# Patient Record
Sex: FEMALE | Race: WHITE | NOT HISPANIC OR LATINO | Employment: STUDENT | URBAN - METROPOLITAN AREA
[De-identification: names, ages, dates, MRNs, and addresses within clinical notes are randomized per-mention and may not be internally consistent; named-entity substitution may affect disease eponyms.]

---

## 2021-03-05 ENCOUNTER — OFFICE VISIT (OUTPATIENT)
Dept: FAMILY MEDICINE CLINIC | Facility: CLINIC | Age: 21
End: 2021-03-05

## 2021-03-05 VITALS
HEART RATE: 75 BPM | BODY MASS INDEX: 21.19 KG/M2 | WEIGHT: 135 LBS | TEMPERATURE: 98.1 F | RESPIRATION RATE: 22 BRPM | OXYGEN SATURATION: 99 % | HEIGHT: 67 IN | SYSTOLIC BLOOD PRESSURE: 110 MMHG | DIASTOLIC BLOOD PRESSURE: 78 MMHG

## 2021-03-05 DIAGNOSIS — Z11.8 SCREENING FOR CHLAMYDIAL DISEASE: ICD-10-CM

## 2021-03-05 DIAGNOSIS — F06.30 MENSTRUAL-RELATED MOOD DISORDER: ICD-10-CM

## 2021-03-05 DIAGNOSIS — Z00.00 ANNUAL PHYSICAL EXAM: ICD-10-CM

## 2021-03-05 DIAGNOSIS — Z83.3 FAMILY HISTORY OF DIABETES MELLITUS: ICD-10-CM

## 2021-03-05 DIAGNOSIS — F41.9 ANXIETY: ICD-10-CM

## 2021-03-05 DIAGNOSIS — Z00.00 ENCOUNTER FOR MEDICAL EXAMINATION TO ESTABLISH CARE: Primary | ICD-10-CM

## 2021-03-05 LAB
ALBUMIN SERPL-MCNC: 4.5 G/DL (ref 3.5–5.2)
ALBUMIN/GLOB SERPL: 1.3 G/DL
ALP SERPL-CCNC: 61 U/L (ref 39–117)
ALT SERPL W P-5'-P-CCNC: 10 U/L (ref 1–33)
ANION GAP SERPL CALCULATED.3IONS-SCNC: 7.8 MMOL/L (ref 5–15)
AST SERPL-CCNC: 19 U/L (ref 1–32)
BASOPHILS # BLD AUTO: 0.03 10*3/MM3 (ref 0–0.2)
BASOPHILS NFR BLD AUTO: 0.7 % (ref 0–1.5)
BILIRUB BLD-MCNC: NEGATIVE MG/DL
BILIRUB SERPL-MCNC: 0.6 MG/DL (ref 0–1.2)
BUN SERPL-MCNC: 14 MG/DL (ref 6–20)
BUN/CREAT SERPL: 21.9 (ref 7–25)
CALCIUM SPEC-SCNC: 9.7 MG/DL (ref 8.6–10.5)
CHLORIDE SERPL-SCNC: 105 MMOL/L (ref 98–107)
CHOLEST SERPL-MCNC: 156 MG/DL (ref 0–200)
CLARITY, POC: ABNORMAL
CO2 SERPL-SCNC: 24.2 MMOL/L (ref 22–29)
COLOR UR: YELLOW
CREAT SERPL-MCNC: 0.64 MG/DL (ref 0.57–1)
DEPRECATED RDW RBC AUTO: 40.4 FL (ref 37–54)
EOSINOPHIL # BLD AUTO: 0.11 10*3/MM3 (ref 0–0.4)
EOSINOPHIL NFR BLD AUTO: 2.4 % (ref 0.3–6.2)
ERYTHROCYTE [DISTWIDTH] IN BLOOD BY AUTOMATED COUNT: 12.9 % (ref 12.3–15.4)
ESTRADIOL SERPL HS-MCNC: 260 PG/ML
EXPIRATION DATE: ABNORMAL
FSH SERPL-ACNC: 2.54 MIU/ML
GFR SERPL CREATININE-BSD FRML MDRD: 118 ML/MIN/1.73
GLOBULIN UR ELPH-MCNC: 3.4 GM/DL
GLUCOSE SERPL-MCNC: 84 MG/DL (ref 65–99)
GLUCOSE UR STRIP-MCNC: NEGATIVE MG/DL
HBA1C MFR BLD: 5.32 % (ref 4.8–5.6)
HCT VFR BLD AUTO: 41.4 % (ref 34–46.6)
HDLC SERPL-MCNC: 68 MG/DL (ref 40–60)
HGB BLD-MCNC: 13.6 G/DL (ref 12–15.9)
IMM GRANULOCYTES # BLD AUTO: 0 10*3/MM3 (ref 0–0.05)
IMM GRANULOCYTES NFR BLD AUTO: 0 % (ref 0–0.5)
KETONES UR QL: NEGATIVE
LDLC SERPL CALC-MCNC: 74 MG/DL (ref 0–100)
LDLC/HDLC SERPL: 1.08 {RATIO}
LEUKOCYTE EST, POC: NEGATIVE
LH SERPL-ACNC: 4.45 MIU/ML
LYMPHOCYTES # BLD AUTO: 2.06 10*3/MM3 (ref 0.7–3.1)
LYMPHOCYTES NFR BLD AUTO: 45 % (ref 19.6–45.3)
Lab: 6023
MCH RBC QN AUTO: 28.7 PG (ref 26.6–33)
MCHC RBC AUTO-ENTMCNC: 32.9 G/DL (ref 31.5–35.7)
MCV RBC AUTO: 87.3 FL (ref 79–97)
MONOCYTES # BLD AUTO: 0.31 10*3/MM3 (ref 0.1–0.9)
MONOCYTES NFR BLD AUTO: 6.8 % (ref 5–12)
NEUTROPHILS NFR BLD AUTO: 2.07 10*3/MM3 (ref 1.7–7)
NEUTROPHILS NFR BLD AUTO: 45.1 % (ref 42.7–76)
NITRITE UR-MCNC: NEGATIVE MG/ML
NRBC BLD AUTO-RTO: 0 /100 WBC (ref 0–0.2)
PH UR: 6 [PH] (ref 5–8)
PLATELET # BLD AUTO: 164 10*3/MM3 (ref 140–450)
PMV BLD AUTO: 12.8 FL (ref 6–12)
POTASSIUM SERPL-SCNC: 4.9 MMOL/L (ref 3.5–5.2)
PROT SERPL-MCNC: 7.9 G/DL (ref 6–8.5)
PROT UR STRIP-MCNC: NEGATIVE MG/DL
RBC # BLD AUTO: 4.74 10*6/MM3 (ref 3.77–5.28)
RBC # UR STRIP: NEGATIVE /UL
SODIUM SERPL-SCNC: 137 MMOL/L (ref 136–145)
SP GR UR: 1.03 (ref 1–1.03)
TRIGL SERPL-MCNC: 74 MG/DL (ref 0–150)
TSH SERPL DL<=0.05 MIU/L-ACNC: 2.11 UIU/ML (ref 0.27–4.2)
UROBILINOGEN UR QL: NORMAL
VLDLC SERPL-MCNC: 14 MG/DL (ref 5–40)
WBC # BLD AUTO: 4.58 10*3/MM3 (ref 3.4–10.8)

## 2021-03-05 PROCEDURE — 99385 PREV VISIT NEW AGE 18-39: CPT | Performed by: NURSE PRACTITIONER

## 2021-03-05 PROCEDURE — 80061 LIPID PANEL: CPT | Performed by: NURSE PRACTITIONER

## 2021-03-05 PROCEDURE — 83001 ASSAY OF GONADOTROPIN (FSH): CPT | Performed by: NURSE PRACTITIONER

## 2021-03-05 PROCEDURE — 84443 ASSAY THYROID STIM HORMONE: CPT | Performed by: NURSE PRACTITIONER

## 2021-03-05 PROCEDURE — 83036 HEMOGLOBIN GLYCOSYLATED A1C: CPT | Performed by: NURSE PRACTITIONER

## 2021-03-05 PROCEDURE — 81003 URINALYSIS AUTO W/O SCOPE: CPT | Performed by: NURSE PRACTITIONER

## 2021-03-05 PROCEDURE — 82670 ASSAY OF TOTAL ESTRADIOL: CPT | Performed by: NURSE PRACTITIONER

## 2021-03-05 PROCEDURE — 85025 COMPLETE CBC W/AUTO DIFF WBC: CPT | Performed by: NURSE PRACTITIONER

## 2021-03-05 PROCEDURE — 80053 COMPREHEN METABOLIC PANEL: CPT | Performed by: NURSE PRACTITIONER

## 2021-03-05 PROCEDURE — 83002 ASSAY OF GONADOTROPIN (LH): CPT | Performed by: NURSE PRACTITIONER

## 2021-03-05 RX ORDER — BUSPIRONE HYDROCHLORIDE 5 MG/1
5 TABLET ORAL 2 TIMES DAILY
Qty: 60 TABLET | Refills: 1 | Status: SHIPPED | OUTPATIENT
Start: 2021-03-05 | End: 2021-08-15 | Stop reason: SDUPTHER

## 2021-03-05 RX ORDER — TYROSINE 500 MG
TABLET ORAL
COMMUNITY
End: 2022-01-17

## 2021-03-05 NOTE — PROGRESS NOTES
Follow Up Office Note     Patient Name: Boston Fenton  : 2000   MRN: [id number]     Chief Complaint:    Chief Complaint   Patient presents with   • Establish Care   • Annual Exam       History of Present Illness: Botson Fenton is a 20 y.o. female who presents today to establish care and for annual physical exam. Patient describes her overall health as good, but she does c/o significant anxiety. 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 She plans to attend law school at  in the fall.     Anxiety  Presents for initial visit. Onset was more than 5 years ago. The problem has been gradually worsening. Symptoms include excessive worry, insomnia, irritability, malaise, muscle tension, nervous/anxious behavior and restlessness. Patient reports no chest pain, decreased concentration, depressed mood, palpitations, panic, shortness of breath or suicidal ideas. Symptoms occur most days. The severity of symptoms is moderate. Exacerbated by: college stress. The quality of sleep is fair. Nighttime awakenings: several.     There are no known risk factors. Her past medical history is significant for anxiety/panic attacks. Past treatments include herbal remedies and lifestyle changes. The treatment provided mild relief. Compliance with prior treatments has been good.        Subjective      Review of Systems:   Review of Systems   Constitutional: Positive for irritability. Negative for activity change, appetite change, chills, diaphoresis, fatigue, fever and unexpected weight change.   HENT: Negative.    Eyes: Negative.    Respiratory: Negative.  Negative for chest tightness and shortness of breath.    Cardiovascular: Negative for chest pain, palpitations and leg swelling.   Gastrointestinal: Negative.    Endocrine: Negative.    Genitourinary: Positive for menstrual problem (Patient states that she believes that there is a hormonal component to her anxiety.  She is requesting  hormone levels be checked today. ). Negative for difficulty urinating, dysuria, flank pain, hematuria, pelvic pain, urgency, vaginal discharge and vaginal pain.   Musculoskeletal: Negative.    Skin: Negative.    Allergic/Immunologic: Positive for environmental allergies.   Neurological: Negative.    Hematological: Negative.    Psychiatric/Behavioral: Positive for dysphoric mood and sleep disturbance. Negative for decreased concentration and suicidal ideas. The patient is nervous/anxious and has insomnia.         Past Medical History: History reviewed. No pertinent past medical history.    Patient's last menstrual period was 02/05/2021.     Medications:     Current Outpatient Medications:   •  Dihydrotachysterol (DHT PO), Take  by mouth., Disp: , Rfl:   •  L-Tyrosine 500 MG tablet, Take  by mouth., Disp: , Rfl:   •  busPIRone (BUSPAR) 5 MG tablet, Take 1 tablet by mouth 2 (Two) Times a Day., Disp: 60 tablet, Rfl: 1    Allergies:   Allergies   Allergen Reactions   • Mucinex [Guaifenesin Er] Swelling     PHQ-2/PHQ-9 Depression Screening 3/5/2021   Little interest or pleasure in doing things 0   Feeling down, depressed, or hopeless 1   Trouble falling or staying asleep, or sleeping too much 0   Feeling tired or having little energy 0   Poor appetite or overeating 0   Feeling bad about yourself - or that you are a failure or have let yourself or your family down 0   Trouble concentrating on things, such as reading the newspaper or watching television 0   Moving or speaking so slowly that other people could have noticed. Or the opposite - being so fidgety or restless that you have been moving around a lot more than usual 0   Thoughts that you would be better off dead, or of hurting yourself in some way 0   Total Score 1   If you checked off any problems, how difficult have these problems made it for you to do your work, take care of things at home, or get along with other people? Not difficult at all     ZULY-7  Feeling  "nervous, anxious or on edge: Several days  Not being able to stop or control worrying: Not at all  Worrying too much about different things: Several days  Trouble Relaxing: Several days  Being so restless that it is hard to sit still: Several days  Feeling afraid as if something awful might happen: Not at all  Becoming easily annoyed or irritable: Several days  ZULY 7 Total Score: 5  If you checked any problems, how difficult have these problems made it for you to do your work, take care of things at home, or get along with other people: Somewhat difficult      Objective     Physical Exam:  Vital Signs:   Vitals:    03/05/21 0738   BP: 110/78   BP Location: Left arm   Patient Position: Sitting   Cuff Size: Adult   Pulse: 75   Resp: 22   Temp: 98.1 °F (36.7 °C)   SpO2: 99%   Weight: 61.2 kg (135 lb)   Height: 170.2 cm (67\")   PainSc: 0-No pain     Body mass index is 21.14 kg/m².     Physical Exam  Vitals signs and nursing note reviewed. Exam conducted with a chaperone present.   Constitutional:       General: She is not in acute distress.     Appearance: Normal appearance. She is well-developed and well-groomed. She is not ill-appearing, toxic-appearing or diaphoretic.   HENT:      Head: Normocephalic and atraumatic.      Right Ear: Tympanic membrane, ear canal and external ear normal.      Left Ear: Tympanic membrane, ear canal and external ear normal.      Nose: Nose normal.      Mouth/Throat:      Lips: Pink.      Mouth: Mucous membranes are moist.      Pharynx: Oropharynx is clear. Uvula midline. No posterior oropharyngeal erythema.   Eyes:      Pupils: Pupils are equal, round, and reactive to light.   Neck:      Musculoskeletal: Normal range of motion and neck supple.      Thyroid: No thyroid mass, thyromegaly or thyroid tenderness.   Cardiovascular:      Rate and Rhythm: Normal rate and regular rhythm.      Pulses: Normal pulses.      Heart sounds: Normal heart sounds, S1 normal and S2 normal. No murmur. "   Pulmonary:      Effort: Pulmonary effort is normal. No respiratory distress.      Breath sounds: Normal breath sounds.   Abdominal:      General: Bowel sounds are normal. There is no distension.      Palpations: Abdomen is soft.      Tenderness: There is no abdominal tenderness.   Musculoskeletal: Normal range of motion.      Right lower leg: No edema.      Left lower leg: No edema.   Lymphadenopathy:      Cervical: No cervical adenopathy.   Skin:     General: Skin is warm and dry.      Capillary Refill: Capillary refill takes 2 to 3 seconds.      Findings: No rash.   Neurological:      General: No focal deficit present.      Mental Status: She is alert and oriented to person, place, and time.   Psychiatric:         Attention and Perception: Attention and perception normal.         Mood and Affect: Mood and affect normal.         Speech: Speech normal.         Behavior: Behavior normal. Behavior is cooperative.         Thought Content: Thought content normal.         Cognition and Memory: Cognition and memory normal.         Judgment: Judgment normal.         Assessment / Plan      Assessment/Plan:   Diagnoses and all orders for this visit:    1. Encounter for medical examination to establish care (Primary)    2. Annual physical exam  -     POCT urinalysis dipstick, automated  -     Comprehensive Metabolic Panel  -     TSH  -     CBC Auto Differential  -     Lipid Panel  -     Cancel: Hepatitis C antibody    Brief Urine Lab Results  (Last result in the past 365 days)      Color   Clarity   Blood   Leuk Est   Nitrite   Protein   CREAT   Urine HCG        03/05/21 0752 Yellow Cloudy Negative Negative Negative Negative             3. Anxiety  -     busPIRone (BUSPAR) 5 MG tablet; Take 1 tablet by mouth 2 (Two) Times a Day.  Dispense: 60 tablet; Refill: 1  -     TSH        -     D/C L-Tyrosine    4. Menstrual-related mood disorder  -     Estradiol  -     Follicle Stimulating Hormone  -     Luteinizing Hormone    5.  Family history of diabetes mellitus  -     Hemoglobin A1c    6. Screening for chlamydial disease         -    UroSwab for chlamydia      Follow Up:   4 weeks for re-evaluation/management anxiety    Discussed the nature of the medical condition(s) risks, complications, implications, management, safe and proper use of medications. Encouraged medication compliance, and keeping scheduled follow up appointments with me and any other providers.      The patient is here for a health maintenance visit.  Currently, the patient consumes a healthy diet and has an adequate exercise regimen. Patient runs for exercise. Screening lab work is ordered.  Immunizations are declined today and vaccine education is provided.  Advice and education is given regarding nutrition, aerobic exercise, routine dental evaluations, routine eye exams, reproductive health, cardiovascular risk reduction, sunscreen use, self skin examination (annual dermatology evaluations) and seat belt use (general overall safety).  Further recommendations after lab evaluation.  Annual wellness evaluations recommended.      ANGELES Duran  [facility] [place]       Please note that portions of this note may have been completed with a voice recognition program. Efforts were made to edit the dictations, but occasionally words are mistranscribed.

## 2021-03-05 NOTE — PATIENT INSTRUCTIONS
Health Maintenance, Female  Adopting a healthy lifestyle and getting preventive care are important in promoting health and wellness. Ask your health care provider about:  · The right schedule for you to have regular tests and exams.  · Things you can do on your own to prevent diseases and keep yourself healthy.  What should I know about diet, weight, and exercise?  Eat a healthy diet    · Eat a diet that includes plenty of vegetables, fruits, low-fat dairy products, and lean protein.  · Do not eat a lot of foods that are high in solid fats, added sugars, or sodium.  Maintain a healthy weight  Body mass index (BMI) is used to identify weight problems. It estimates body fat based on height and weight. Your health care provider can help determine your BMI and help you achieve or maintain a healthy weight.  Get regular exercise  Get regular exercise. This is one of the most important things you can do for your health. Most adults should:  · Exercise for at least 150 minutes each week. The exercise should increase your heart rate and make you sweat (moderate-intensity exercise).  · Do strengthening exercises at least twice a week. This is in addition to the moderate-intensity exercise.  · Spend less time sitting. Even light physical activity can be beneficial.  Watch cholesterol and blood lipids  Have your blood tested for lipids and cholesterol at 20 years of age, then have this test every 5 years.  Have your cholesterol levels checked more often if:  · Your lipid or cholesterol levels are high.  · You are older than 40 years of age.  · You are at high risk for heart disease.  What should I know about cancer screening?  Depending on your health history and family history, you may need to have cancer screening at various ages. This may include screening for:  · Breast cancer.  · Cervical cancer.  · Colorectal cancer.  · Skin cancer.  · Lung cancer.  What should I know about heart disease, diabetes, and high blood  pressure?  Blood pressure and heart disease  · High blood pressure causes heart disease and increases the risk of stroke. This is more likely to develop in people who have high blood pressure readings, are of  descent, or are overweight.  · Have your blood pressure checked:  ? Every 3-5 years if you are 18-39 years of age.  ? Every year if you are 40 years old or older.  Diabetes  Have regular diabetes screenings. This checks your fasting blood sugar level. Have the screening done:  · Once every three years after age 40 if you are at a normal weight and have a low risk for diabetes.  · More often and at a younger age if you are overweight or have a high risk for diabetes.  What should I know about preventing infection?  Hepatitis B  If you have a higher risk for hepatitis B, you should be screened for this virus. Talk with your health care provider to find out if you are at risk for hepatitis B infection.  Hepatitis C  Testing is recommended for:  · Everyone born from 1945 through 1965.  · Anyone with known risk factors for hepatitis C.  Sexually transmitted infections (STIs)  · Get screened for STIs, including gonorrhea and chlamydia, if:  ? You are sexually active and are younger than 24 years of age.  ? You are older than 24 years of age and your health care provider tells you that you are at risk for this type of infection.  ? Your sexual activity has changed since you were last screened, and you are at increased risk for chlamydia or gonorrhea. Ask your health care provider if you are at risk.  · Ask your health care provider about whether you are at high risk for HIV. Your health care provider may recommend a prescription medicine to help prevent HIV infection. If you choose to take medicine to prevent HIV, you should first get tested for HIV. You should then be tested every 3 months for as long as you are taking the medicine.  Pregnancy  · If you are about to stop having your period (premenopausal) and  you may become pregnant, seek counseling before you get pregnant.  · Take 400 to 800 micrograms (mcg) of folic acid every day if you become pregnant.  · Ask for birth control (contraception) if you want to prevent pregnancy.  Osteoporosis and menopause  Osteoporosis is a disease in which the bones lose minerals and strength with aging. This can result in bone fractures. If you are 65 years old or older, or if you are at risk for osteoporosis and fractures, ask your health care provider if you should:  · Be screened for bone loss.  · Take a calcium or vitamin D supplement to lower your risk of fractures.  · Be given hormone replacement therapy (HRT) to treat symptoms of menopause.  Follow these instructions at home:  Lifestyle  · Do not use any products that contain nicotine or tobacco, such as cigarettes, e-cigarettes, and chewing tobacco. If you need help quitting, ask your health care provider.  · Do not use street drugs.  · Do not share needles.  · Ask your health care provider for help if you need support or information about quitting drugs.  Alcohol use  · Do not drink alcohol if:  ? Your health care provider tells you not to drink.  ? You are pregnant, may be pregnant, or are planning to become pregnant.  · If you drink alcohol:  ? Limit how much you use to 0-1 drink a day.  ? Limit intake if you are breastfeeding.  · Be aware of how much alcohol is in your drink. In the [place] one drink equals one 12 oz bottle of beer (355 mL), one 5 oz glass of wine (148 mL), or one 1½ oz glass of hard liquor (44 mL).  General instructions  · Schedule regular health, dental, and eye exams.  · Stay current with your vaccines.  · Tell your health care provider if:  ? You often feel depressed.  ? You have ever been abused or do not feel safe at home.  Summary  · Adopting a healthy lifestyle and getting preventive care are important in promoting health and wellness.  · Follow your health care provider's instructions about healthy  diet, exercising, and getting tested or screened for diseases.  · Follow your health care provider's instructions on monitoring your cholesterol and blood pressure.  This information is not intended to replace advice given to you by your health care provider. Make sure you discuss any questions you have with your health care provider.  Document Revised: 12/11/2019 Document Reviewed: 12/11/2019  [employer] Patient Education © 2020 [employer]    Preventive Care 21-39 Years Old, Female  Preventive care refers to visits with your health care provider and lifestyle choices that can promote health and wellness. This includes:  · A yearly physical exam. This may also be called an annual well check.  · Regular dental visits and eye exams.  · Immunizations.  · Screening for certain conditions.  · Healthy lifestyle choices, such as eating a healthy diet, getting regular exercise, not using drugs or products that contain nicotine and tobacco, and limiting alcohol use.  What can I expect for my preventive care visit?  Physical exam  Your health care provider will check your:  · Height and weight. This may be used to calculate body mass index (BMI), which tells if you are at a healthy weight.  · Heart rate and blood pressure.  · Skin for abnormal spots.  Counseling  Your health care provider may ask you questions about your:  · Alcohol, tobacco, and drug use.  · Emotional well-being.  · Home and relationship well-being.  · Sexual activity.  · Eating habits.  · Work and work environment.  · Method of birth control.  · Menstrual cycle.  · Pregnancy history.  What immunizations do I need?    Influenza (flu) vaccine  · This is recommended every year.  Tetanus, diphtheria, and pertussis (Tdap) vaccine  · You may need a Td booster every 10 years.  Varicella (chickenpox) vaccine  · You may need this if you have not been vaccinated.  Human papillomavirus (HPV) vaccine  · If recommended by your health care provider, you may need three  doses over 6 months.  Measles, mumps, and rubella (MMR) vaccine  · You may need at least one dose of MMR. You may also need a second dose.  Meningococcal conjugate (MenACWY) vaccine  · One dose is recommended if you are age 19-21 years and a first-year college student living in a residence baer, or if you have one of several medical conditions. You may also need additional booster doses.  Pneumococcal conjugate (PCV13) vaccine  · You may need this if you have certain conditions and were not previously vaccinated.  Pneumococcal polysaccharide (PPSV23) vaccine  · You may need one or two doses if you smoke cigarettes or if you have certain conditions.  Hepatitis A vaccine  · You may need this if you have certain conditions or if you travel or work in places where you may be exposed to hepatitis A.  Hepatitis B vaccine  · You may need this if you have certain conditions or if you travel or work in places where you may be exposed to hepatitis B.  Haemophilus influenzae type b (Hib) vaccine  · You may need this if you have certain conditions.  You may receive vaccines as individual doses or as more than one vaccine together in one shot (combination vaccines). Talk with your health care provider about the risks and benefits of combination vaccines.  What tests do I need?    Blood tests  · Lipid and cholesterol levels. These may be checked every 5 years starting at age 20.  · Hepatitis C test.  · Hepatitis B test.  Screening  · Diabetes screening. This is done by checking your blood sugar (glucose) after you have not eaten for a while (fasting).  · Sexually transmitted disease (STD) testing.  · BRCA-related cancer screening. This may be done if you have a family history of breast, ovarian, tubal, or peritoneal cancers.  · Pelvic exam and Pap test. This may be done every 3 years starting at age 21. Starting at age 30, this may be done every 5 years if you have a Pap test in combination with an HPV test.  Talk with your  health care provider about your test results, treatment options, and if necessary, the need for more tests.  Follow these instructions at home:  Eating and drinking    · Eat a diet that includes fresh fruits and vegetables, whole grains, lean protein, and low-fat dairy.  · Take vitamin and mineral supplements as recommended by your health care provider.  · Do not drink alcohol if:  ? Your health care provider tells you not to drink.  ? You are pregnant, may be pregnant, or are planning to become pregnant.  · If you drink alcohol:  ? Limit how much you have to 0-1 drink a day.  ? Be aware of how much alcohol is in your drink. In the [place] one drink equals one 12 oz bottle of beer (355 mL), one 5 oz glass of wine (148 mL), or one 1½ oz glass of hard liquor (44 mL).  Lifestyle  · Take daily care of your teeth and gums.  · Stay active. Exercise for at least 30 minutes on 5 or more days each week.  · Do not use any products that contain nicotine or tobacco, such as cigarettes, e-cigarettes, and chewing tobacco. If you need help quitting, ask your health care provider.  · If you are sexually active, practice safe sex. Use a condom or other form of birth control (contraception) in order to prevent pregnancy and STIs (sexually transmitted infections). If you plan to become pregnant, see your health care provider for a preconception visit.  What's next?  · Visit your health care provider once a year for a well check visit.  · Ask your health care provider how often you should have your eyes and teeth checked.  · Stay up to date on all vaccines.  This information is not intended to replace advice given to you by your health care provider. Make sure you discuss any questions you have with your health care provider.  Document Revised: 08/29/2019 Document Reviewed: 08/29/2019  [employer] Patient Education © 2020 [employer]    Managing Anxiety, Adult  After being diagnosed with an anxiety disorder, you may be relieved to know  why you have felt or behaved a certain way. You may also feel overwhelmed about the treatment ahead and what it will mean for your life. With care and support, you can manage this condition and recover from it.  How to manage lifestyle changes  Managing stress and anxiety    Stress is your body's reaction to life changes and events, both good and bad. Most stress will last just a few hours, but stress can be ongoing and can lead to more than just stress. Although stress can play a major role in anxiety, it is not the same as anxiety. Stress is usually caused by something external, such as a deadline, test, or competition. Stress normally passes after the triggering event has ended.   Anxiety is caused by something internal, such as imagining a terrible outcome or worrying that something will go wrong that will devastate you. Anxiety often does not go away even after the triggering event is over, and it can become long-term (chronic) worry. It is important to understand the differences between stress and anxiety and to manage your stress effectively so that it does not lead to an anxious response.  Talk with your health care provider or a counselor to learn more about reducing anxiety and stress. He or she may suggest tension reduction techniques, such as:  · Music therapy. This can include creating or listening to music that you enjoy and that inspires you.  · Mindfulness-based meditation. This involves being aware of your normal breaths while not trying to control your breathing. It can be done while sitting or walking.  · Centering prayer. This involves focusing on a word, phrase, or sacred image that means something to you and brings you peace.  · Deep breathing. To do this, expand your stomach and inhale slowly through your nose. Hold your breath for 3-5 seconds. Then exhale slowly, letting your stomach muscles relax.  · Self-talk. This involves identifying thought patterns that lead to anxiety reactions and  changing those patterns.  · Muscle relaxation. This involves tensing muscles and then relaxing them.  Choose a tension reduction technique that suits your lifestyle and personality. These techniques take time and practice. Set aside 5-15 minutes a day to do them. Therapists can offer counseling and training in these techniques. The training to help with anxiety may be covered by some insurance plans. Other things you can do to manage stress and anxiety include:  · Keeping a stress/anxiety diary. This can help you learn what triggers your reaction and then learn ways to manage your response.  · Thinking about how you react to certain situations. You may not be able to control everything, but you can control your response.  · Making time for activities that help you relax and not feeling guilty about spending your time in this way.  · Visual imagery and yoga can help you stay calm and relax.    Medicines  Medicines can help ease symptoms. Medicines for anxiety include:  · Anti-anxiety drugs.  · Antidepressants.  Medicines are often used as a primary treatment for anxiety disorder. Medicines will be prescribed by a health care provider. When used together, medicines, psychotherapy, and tension reduction techniques may be the most effective treatment.  Relationships  Relationships can play a big part in helping you recover. Try to spend more time connecting with trusted friends and family members. Consider going to couples counseling, taking family education classes, or going to family therapy. Therapy can help you and others better understand your condition.  How to recognize changes in your anxiety  Everyone responds differently to treatment for anxiety. Recovery from anxiety happens when symptoms decrease and stop interfering with your daily activities at home or work. This may mean that you will start to:  · Have better concentration and focus. Worry will interfere less in your daily thinking.  · Sleep better.  · Be  less irritable.  · Have more energy.  · Have improved memory.  It is important to recognize when your condition is getting worse. Contact your health care provider if your symptoms interfere with home or work and you feel like your condition is not improving.  Follow these instructions at home:  Activity  · Exercise. Most adults should do the following:  ? Exercise for at least 150 minutes each week. The exercise should increase your heart rate and make you sweat (moderate-intensity exercise).  ? Strengthening exercises at least twice a week.  · Get the right amount and quality of sleep. Most adults need 7-9 hours of sleep each night.  Lifestyle    · Eat a healthy diet that includes plenty of vegetables, fruits, whole grains, low-fat dairy products, and lean protein. Do not eat a lot of foods that are high in solid fats, added sugars, or salt.  · Make choices that simplify your life.  · Do not use any products that contain nicotine or tobacco, such as cigarettes, e-cigarettes, and chewing tobacco. If you need help quitting, ask your health care provider.  · Avoid caffeine, alcohol, and certain over-the-counter cold medicines. These may make you feel worse. Ask your pharmacist which medicines to avoid.  General instructions  · Take over-the-counter and prescription medicines only as told by your health care provider.  · Keep all follow-up visits as told by your health care provider. This is important.  Where to find support  You can get help and support from these sources:  · Self-help groups.  · Online and community organizations.  · A trusted spiritual leader.  · Couples counseling.  · Family education classes.  · Family therapy.  Where to find more information  You may find that joining a support group helps you deal with your anxiety. The following sources can help you locate counselors or support groups near you:  · Mental Health [place]: www.mentalhealth[place].net  · Anxiety and Depression Association of  [place] (ADAA): www.adaa.org  · National [place] on Mental Illness ([place]): www.[place].org  Contact a health care provider if you:  · Have a hard time staying focused or finishing daily tasks.  · Spend many hours a day feeling worried about everyday life.  · Become exhausted by worry.  · Start to have headaches, feel tense, or have nausea.  · Urinate more than normal.  · Have diarrhea.  Get help right away if you have:  · A racing heart and shortness of breath.  · Thoughts of hurting yourself or others.  If you ever feel like you may hurt yourself or others, or have thoughts about taking your own life, get help right away. You can go to your nearest emergency department or call:  · Your local emergency services (911 in the [place]  · A suicide crisis helpline, such as the National Suicide Prevention Lifeline at 1-581.779.3712. This is open 24 hours a day.  Summary  · Taking steps to learn and use tension reduction techniques can help calm you and help prevent triggering an anxiety reaction.  · When used together, medicines, psychotherapy, and tension reduction techniques may be the most effective treatment.  · Family, friends, and partners can play a big part in helping you recover from an anxiety disorder.  This information is not intended to replace advice given to you by your health care provider. Make sure you discuss any questions you have with your health care provider.  Document Revised: 05/19/2020 Document Reviewed: 05/19/2020  [employer] Patient Education © 2020 [employer]  Buspirone tablets  What is this medicine?  BUSPIRONE (bycraig lind) is used to treat anxiety disorders.  This medicine may be used for other purposes; ask your health care provider or pharmacist if you have questions.  COMMON BRAND NAME(S): BuSpar  What should I tell my health care provider before I take this medicine?  They need to know if you have any of these conditions:  · kidney or liver disease  · an unusual or allergic reaction to  buspirone, other medicines, foods, dyes, or preservatives  · pregnant or trying to get pregnant  · breast-feeding  How should I use this medicine?  Take this medicine by mouth with a glass of water. Follow the directions on the prescription label. You may take this medicine with or without food. To ensure that this medicine always works the same way for you, you should take it either always with or always without food. Take your doses at regular intervals. Do not take your medicine more often than directed. Do not stop taking except on the advice of your doctor or health care professional.  Talk to your pediatrician regarding the use of this medicine in children. Special care may be needed.  Overdosage: If you think you have taken too much of this medicine contact a poison control center or emergency room at once.  NOTE: This medicine is only for you. Do not share this medicine with others.  What if I miss a dose?  If you miss a dose, take it as soon as you can. If it is almost time for your next dose, take only that dose. Do not take double or extra doses.  What may interact with this medicine?  Do not take this medicine with any of the following medications:  · linezolid  · MAOIs like Carbex, Eldepryl, Marplan, Nardil, and Parnate  · methylene blue  · procarbazine  This medicine may also interact with the following medications:  · diazepam  · digoxin  · diltiazem  · erythromycin  · grapefruit juice  · haloperidol  · medicines for mental depression or mood problems  · medicines for seizures like carbamazepine, phenobarbital and phenytoin  · nefazodone  · other medications for anxiety  · rifampin  · ritonavir  · some antifungal medicines like itraconazole, ketoconazole, and voriconazole  · verapamil  · warfarin  This list may not describe all possible interactions. Give your health care provider a list of all the medicines, herbs, non-prescription drugs, or dietary supplements you use. Also tell them if you smoke,  drink alcohol, or use illegal drugs. Some items may interact with your medicine.  What should I watch for while using this medicine?  Visit your doctor or health care professional for regular checks on your progress. It may take 1 to 2 weeks before your anxiety gets better.  You may get drowsy or dizzy. Do not drive, use machinery, or do anything that needs mental alertness until you know how this drug affects you. Do not stand or sit up quickly, especially if you are an older patient. This reduces the risk of dizzy or fainting spells. Alcohol can make you more drowsy and dizzy. Avoid alcoholic drinks.  What side effects may I notice from receiving this medicine?  Side effects that you should report to your doctor or health care professional as soon as possible:  · blurred vision or other vision changes  · chest pain  · confusion  · difficulty breathing  · feelings of hostility or anger  · muscle aches and pains  · numbness or tingling in hands or feet  · ringing in the ears  · skin rash and itching  · vomiting  · weakness  Side effects that usually do not require medical attention (report to your doctor or health care professional if they continue or are bothersome):  · disturbed dreams, nightmares  · headache  · nausea  · restlessness or nervousness  · sore throat and nasal congestion  · stomach upset  This list may not describe all possible side effects. Call your doctor for medical advice about side effects. You may report side effects to FDA at 8-025-FDA-7711.  Where should I keep my medicine?  Keep out of the reach of children.  Store at room temperature below 30 degrees C (86 degrees F). Protect from light. Keep container tightly closed. Throw away any unused medicine after the expiration date.  NOTE: This sheet is a summary. It may not cover all possible information. If you have questions about this medicine, talk to your doctor, pharmacist, or health care provider.  © 2021 [employer] (2011-07-28  18:06:11)

## 2021-03-30 ENCOUNTER — OFFICE VISIT (OUTPATIENT)
Dept: FAMILY MEDICINE CLINIC | Facility: CLINIC | Age: 21
End: 2021-03-30

## 2021-03-30 VITALS
DIASTOLIC BLOOD PRESSURE: 72 MMHG | RESPIRATION RATE: 20 BRPM | SYSTOLIC BLOOD PRESSURE: 102 MMHG | OXYGEN SATURATION: 96 % | BODY MASS INDEX: 20.4 KG/M2 | HEART RATE: 100 BPM | HEIGHT: 67 IN | WEIGHT: 130 LBS | TEMPERATURE: 98.2 F

## 2021-03-30 DIAGNOSIS — R11.0 NAUSEA: ICD-10-CM

## 2021-03-30 DIAGNOSIS — F41.9 ANXIETY: Primary | ICD-10-CM

## 2021-03-30 DIAGNOSIS — G47.00 INSOMNIA, UNSPECIFIED TYPE: ICD-10-CM

## 2021-03-30 DIAGNOSIS — L70.9 ACNE, UNSPECIFIED ACNE TYPE: ICD-10-CM

## 2021-03-30 PROCEDURE — 99214 OFFICE O/P EST MOD 30 MIN: CPT | Performed by: NURSE PRACTITIONER

## 2021-03-30 RX ORDER — HYDROXYZINE HYDROCHLORIDE 25 MG/1
25 TABLET, FILM COATED ORAL NIGHTLY PRN
Qty: 30 TABLET | Refills: 2 | Status: SHIPPED | OUTPATIENT
Start: 2021-03-30 | End: 2021-08-15 | Stop reason: SDUPTHER

## 2021-03-30 RX ORDER — ONDANSETRON 4 MG/1
4 TABLET, ORALLY DISINTEGRATING ORAL EVERY 8 HOURS PRN
Qty: 20 TABLET | Refills: 0 | OUTPATIENT
Start: 2021-03-30 | End: 2022-01-17

## 2021-03-30 NOTE — PATIENT INSTRUCTIONS
Nausea, Adult  Nausea is the feeling that you have an upset stomach or that you are about to vomit. Nausea on its own is not usually a serious concern, but it may be an early sign of a more serious medical problem. As nausea gets worse, it can lead to vomiting. If vomiting develops, or if you are not able to drink enough fluids, you are at risk of becoming dehydrated. Dehydration can make you tired and thirsty, cause you to have a dry mouth, and decrease how often you urinate. Older adults and people with other diseases or a weak disease-fighting system (immune system) are at higher risk for dehydration. The main goals of treating your nausea are:  · To relieve your nausea.  · To limit repeated nausea episodes.  · To prevent vomiting and dehydration.  Follow these instructions at home:  Watch your symptoms for any changes. Tell your health care provider about them. Follow these instructions as told by your health care provider.  Eating and drinking         · Take an oral rehydration solution (ORS). This is a drink that is sold at pharmacies and retail stores.  · Drink clear fluids slowly and in small amounts as you are able. Clear fluids include water, ice chips, low-calorie sports drinks, and fruit juice that has water added (diluted fruit juice).  · Eat bland, easy-to-digest foods in small amounts as you are able. These foods include bananas, applesauce, rice, lean meats, toast, and crackers.  · Avoid drinking fluids that contain a lot of sugar or caffeine, such as energy drinks, sports drinks, and soda.  · Avoid alcohol.  · Avoid spicy or fatty foods.  General instructions  · Take over-the-counter and prescription medicines only as told by your health care provider.  · Rest at home while you recover.  · Drink enough fluid to keep your urine pale yellow.  · Breathe slowly and deeply when you feel nauseous.  · Avoid smelling things that have strong odors.  · Wash your hands often using soap and water. If soap and  water are not available, use hand .  · Make sure that all people in your household wash their hands well and often.  · Keep all follow-up visits as told by your health care provider. This is important.  Contact a health care provider if:  · Your nausea gets worse.  · Your nausea does not go away after two days.  · You vomit.  · You cannot drink fluids without vomiting.  · You have any of the following:  ? New symptoms.  ? A fever.  ? A headache.  ? Muscle cramps.  ? A rash.  ? Pain while urinating.  · You feel light-headed or dizzy.  Get help right away if:  · You have pain in your chest, neck, arm, or jaw.  · You feel extremely weak or you faint.  · You have vomit that is bright red or looks like coffee grounds.  · You have bloody or black stools or stools that look like tar.  · You have a severe headache, a stiff neck, or both.  · You have severe pain, cramping, or bloating in your abdomen.  · You have difficulty breathing or are breathing very quickly.  · Your heart is beating very quickly.  · Your skin feels cold and clammy.  · You feel confused.  · You have signs of dehydration, such as:  ? Dark urine, very little urine, or no urine.  ? Cracked lips.  ? Dry mouth.  ? Sunken eyes.  ? Sleepiness.  ? Weakness.  These symptoms may represent a serious problem that is an emergency. Do not wait to see if the symptoms will go away. Get medical help right away. Call your local emergency services (911 in the [place] Do not drive yourself to the hospital.  Summary  · Nausea is the feeling that you have an upset stomach or that you are about to vomit. Nausea on its own is not usually a serious concern, but it may be an early sign of a more serious medical problem.  · If vomiting develops, or if you are not able to drink enough fluids, you are at risk of becoming dehydrated.  · Follow recommendations for eating and drinking and take over-the-counter and prescription medicines only as told by your health care  provider.  · Contact a health care provider right away if your symptoms worsen or you have new symptoms.  · Keep all follow-up visits as told by your health care provider. This is important.  This information is not intended to replace advice given to you by your health care provider. Make sure you discuss any questions you have with your health care provider.  Document Revised: 11/17/2020 Document Reviewed: 05/28/2019  [employer] Patient Education © 2021 [employer]    Managing Anxiety, Adult  After being diagnosed with an anxiety disorder, you may be relieved to know why you have felt or behaved a certain way. You may also feel overwhelmed about the treatment ahead and what it will mean for your life. With care and support, you can manage this condition and recover from it.  How to manage lifestyle changes  Managing stress and anxiety    Stress is your body's reaction to life changes and events, both good and bad. Most stress will last just a few hours, but stress can be ongoing and can lead to more than just stress. Although stress can play a major role in anxiety, it is not the same as anxiety. Stress is usually caused by something external, such as a deadline, test, or competition. Stress normally passes after the triggering event has ended.   Anxiety is caused by something internal, such as imagining a terrible outcome or worrying that something will go wrong that will devastate you. Anxiety often does not go away even after the triggering event is over, and it can become long-term (chronic) worry. It is important to understand the differences between stress and anxiety and to manage your stress effectively so that it does not lead to an anxious response.  Talk with your health care provider or a counselor to learn more about reducing anxiety and stress. He or she may suggest tension reduction techniques, such as:  · Music therapy. This can include creating or listening to music that you enjoy and that  inspires you.  · Mindfulness-based meditation. This involves being aware of your normal breaths while not trying to control your breathing. It can be done while sitting or walking.  · Centering prayer. This involves focusing on a word, phrase, or sacred image that means something to you and brings you peace.  · Deep breathing. To do this, expand your stomach and inhale slowly through your nose. Hold your breath for 3-5 seconds. Then exhale slowly, letting your stomach muscles relax.  · Self-talk. This involves identifying thought patterns that lead to anxiety reactions and changing those patterns.  · Muscle relaxation. This involves tensing muscles and then relaxing them.  Choose a tension reduction technique that suits your lifestyle and personality. These techniques take time and practice. Set aside 5-15 minutes a day to do them. Therapists can offer counseling and training in these techniques. The training to help with anxiety may be covered by some insurance plans. Other things you can do to manage stress and anxiety include:  · Keeping a stress/anxiety diary. This can help you learn what triggers your reaction and then learn ways to manage your response.  · Thinking about how you react to certain situations. You may not be able to control everything, but you can control your response.  · Making time for activities that help you relax and not feeling guilty about spending your time in this way.  · Visual imagery and yoga can help you stay calm and relax.    Medicines  Medicines can help ease symptoms. Medicines for anxiety include:  · Anti-anxiety drugs.  · Antidepressants.  Medicines are often used as a primary treatment for anxiety disorder. Medicines will be prescribed by a health care provider. When used together, medicines, psychotherapy, and tension reduction techniques may be the most effective treatment.  Relationships  Relationships can play a big part in helping you recover. Try to spend more time  connecting with trusted friends and family members. Consider going to couples counseling, taking family education classes, or going to family therapy. Therapy can help you and others better understand your condition.  How to recognize changes in your anxiety  Everyone responds differently to treatment for anxiety. Recovery from anxiety happens when symptoms decrease and stop interfering with your daily activities at home or work. This may mean that you will start to:  · Have better concentration and focus. Worry will interfere less in your daily thinking.  · Sleep better.  · Be less irritable.  · Have more energy.  · Have improved memory.  It is important to recognize when your condition is getting worse. Contact your health care provider if your symptoms interfere with home or work and you feel like your condition is not improving.  Follow these instructions at home:  Activity  · Exercise. Most adults should do the following:  ? Exercise for at least 150 minutes each week. The exercise should increase your heart rate and make you sweat (moderate-intensity exercise).  ? Strengthening exercises at least twice a week.  · Get the right amount and quality of sleep. Most adults need 7-9 hours of sleep each night.  Lifestyle    · Eat a healthy diet that includes plenty of vegetables, fruits, whole grains, low-fat dairy products, and lean protein. Do not eat a lot of foods that are high in solid fats, added sugars, or salt.  · Make choices that simplify your life.  · Do not use any products that contain nicotine or tobacco, such as cigarettes, e-cigarettes, and chewing tobacco. If you need help quitting, ask your health care provider.  · Avoid caffeine, alcohol, and certain over-the-counter cold medicines. These may make you feel worse. Ask your pharmacist which medicines to avoid.  General instructions  · Take over-the-counter and prescription medicines only as told by your health care provider.  · Keep all follow-up  visits as told by your health care provider. This is important.  Where to find support  You can get help and support from these sources:  · Self-help groups.  · Online and community organizations.  · A trusted spiritual leader.  · Couples counseling.  · Family education classes.  · Family therapy.  Where to find more information  You may find that joining a support group helps you deal with your anxiety. The following sources can help you locate counselors or support groups near you:  · Mental Health [place]: www.mentalhealth[place].net  · Anxiety and Depression Association of [place] (ADAA): www.adaa.org  · National [place] [employer]ness ([place]): www.[place].org  Contact a health care provider if you:  · Have a hard time staying focused or finishing daily tasks.  · Spend many hours a day feeling worried about everyday life.  · Become exhausted by worry.  · Start to have headaches, feel tense, or have nausea.  · Urinate more than normal.  · Have diarrhea.  Get help right away if you have:  · A racing heart and shortness of breath.  · Thoughts of hurting yourself or others.  If you ever feel like you may hurt yourself or others, or have thoughts about taking your own life, get help right away. You can go to your nearest emergency department or call:  · Your local emergency services (911 in the [place]  · A suicide crisis helpline, such as the National Suicide Prevention Lifeline at 1-534.144.4886. This is open 24 hours a day.  Summary  · Taking steps to learn and use tension reduction techniques can help calm you and help prevent triggering an anxiety reaction.  · When used together, medicines, psychotherapy, and tension reduction techniques may be the most effective treatment.  · Family, friends, and partners can play a big part in helping you recover from an anxiety disorder.  This information is not intended to replace advice given to you by your health care provider. Make sure you discuss any questions you have  with your health care provider.  Document Revised: 05/19/2020 Document Reviewed: 05/19/2020  [employer] Patient Education © 2021 [employer].    Insomnia  Insomnia is a sleep disorder that makes it difficult to fall asleep or stay asleep. Insomnia can cause fatigue, low energy, difficulty concentrating, mood swings, and poor performance at work or school.  There are three different ways to classify insomnia:  · Difficulty falling asleep.  · Difficulty staying asleep.  · Waking up too early in the morning.  Any type of insomnia can be long-term (chronic) or short-term (acute). Both are common. Short-term insomnia usually lasts for three months or less. Chronic insomnia occurs at least three times a week for longer than three months.  What are the causes?  Insomnia may be caused by another condition, situation, or substance, such as:  · Anxiety.  · Certain medicines.  · Gastroesophageal reflux disease (GERD) or other gastrointestinal conditions.  · Asthma or other breathing conditions.  · Restless legs syndrome, sleep apnea, or other sleep disorders.  · Chronic pain.  · Menopause.  · Stroke.  · Abuse of alcohol, tobacco, or illegal drugs.  · Mental health conditions, such as depression.  · Caffeine.  · Neurological disorders, such as Alzheimer's disease.  · An overactive thyroid (hyperthyroidism).  Sometimes, the cause of insomnia may not be known.  What increases the risk?  Risk factors for insomnia include:  · Gender. Women are affected more often than men.  · Age. Insomnia is more common as you get older.  · Stress.  · Lack of exercise.  · Irregular work schedule or working night shifts.  · Traveling between different time zones.  · Certain medical and mental health conditions.  What are the signs or symptoms?  If you have insomnia, the main symptom is having trouble falling asleep or having trouble staying asleep. This may lead to other symptoms, such as:  · Feeling fatigued or having low energy.  · Feeling nervous  about going to sleep.  · Not feeling rested in the morning.  · Having trouble concentrating.  · Feeling irritable, anxious, or depressed.  How is this diagnosed?  This condition may be diagnosed based on:  · Your symptoms and medical history. Your health care provider may ask about:  ? Your sleep habits.  ? Any medical conditions you have.  ? Your mental health.  · A physical exam.  How is this treated?  Treatment for insomnia depends on the cause. Treatment may focus on treating an underlying condition that is causing insomnia. Treatment may also include:  · Medicines to help you sleep.  · Counseling or therapy.  · Lifestyle adjustments to help you sleep better.  Follow these instructions at home:  Eating and drinking    · Limit or avoid alcohol, caffeinated beverages, and cigarettes, especially close to bedtime. These can disrupt your sleep.  · Do not eat a large meal or eat spicy foods right before bedtime. This can lead to digestive discomfort that can make it hard for you to sleep.  Sleep habits    · Keep a sleep diary to help you and your health care provider figure out what could be causing your insomnia. Write down:  ? When you sleep.  ? When you wake up during the night.  ? How well you sleep.  ? How rested you feel the next day.  ? Any side effects of medicines you are taking.  ? What you eat and drink.  · Make your bedroom a dark, comfortable place where it is easy to fall asleep.  ? Put up shades or blackout curtains to block light from outside.  ? Use a white noise machine to block noise.  ? Keep the temperature cool.  · Limit screen use before bedtime. This includes:  ? Watching TV.  ? Using your smartphone, tablet, or computer.  · Stick to a routine that includes going to bed and waking up at the same times every day and night. This can help you fall asleep faster. Consider making a quiet activity, such as reading, part of your nighttime routine.  · Try to avoid taking naps during the day so that you  sleep better at night.  · Get out of bed if you are still awake after 15 minutes of trying to sleep. Keep the lights down, but try reading or doing a quiet activity. When you feel sleepy, go back to bed.  General instructions  · Take over-the-counter and prescription medicines only as told by your health care provider.  · Exercise regularly, as told by your health care provider. Avoid exercise starting several hours before bedtime.  · Use relaxation techniques to manage stress. Ask your health care provider to suggest some techniques that may work well for you. These may include:  ? Breathing exercises.  ? Routines to release muscle tension.  ? Visualizing peaceful scenes.  · Make sure that you drive carefully. Avoid driving if you feel very sleepy.  · Keep all follow-up visits as told by your health care provider. This is important.  Contact a health care provider if:  · You are tired throughout the day.  · You have trouble in your daily routine due to sleepiness.  · You continue to have sleep problems, or your sleep problems get worse.  Get help right away if:  · You have serious thoughts about hurting yourself or someone else.  If you ever feel like you may hurt yourself or others, or have thoughts about taking your own life, get help right away. You can go to your nearest emergency department or call:  · Your local emergency services (911 in the [place]  · A suicide crisis helpline, such as the National Suicide Prevention Lifeline at 1-438.416.9683. This is open 24 hours a day.  Summary  · Insomnia is a sleep disorder that makes it difficult to fall asleep or stay asleep.  · Insomnia can be long-term (chronic) or short-term (acute).  · Treatment for insomnia depends on the cause. Treatment may focus on treating an underlying condition that is causing insomnia.  · Keep a sleep diary to help you and your health care provider figure out what could be causing your insomnia.  This information is not intended to  replace advice given to you by your health care provider. Make sure you discuss any questions you have with your health care provider.  Document Revised: 11/30/2018 Document Reviewed: 09/27/2018  [employer] Patient Education © 2021 [employer]  Hydroxyzine capsules or tablets  What is this medicine?  HYDROXYZINE (vy DROX i zeen) is an antihistamine. This medicine is used to treat allergy symptoms. It is also used to treat anxiety and tension. This medicine can be used with other medicines to induce sleep before surgery.  This medicine may be used for other purposes; ask your health care provider or pharmacist if you have questions.  COMMON BRAND NAME(S): ANX, Atarax, Rezine, Vistaril  What should I tell my health care provider before I take this medicine?  They need to know if you have any of these conditions:  · glaucoma  · heart disease  · history of irregular heartbeat  · kidney disease  · liver disease  · lung or breathing disease, like asthma  · stomach or intestine problems  · thyroid disease  · trouble passing urine  · an unusual or allergic reaction to hydroxyzine, cetirizine, other medicines, foods, dyes or preservatives  · pregnant or trying to get pregnant  · breast-feeding  How should I use this medicine?  Take this medicine by mouth with a full glass of water. Follow the directions on the prescription label. You may take this medicine with food or on an empty stomach. Take your medicine at regular intervals. Do not take your medicine more often than directed.  Talk to your pediatrician regarding the use of this medicine in children. Special care may be needed. While this drug may be prescribed for children as young as 6 years of age for selected conditions, precautions do apply.  Patients over 65 years old may have a stronger reaction and need a smaller dose.  Overdosage: If you think you have taken too much of this medicine contact a poison control center or emergency room at once.  NOTE: This  medicine is only for you. Do not share this medicine with others.  What if I miss a dose?  If you miss a dose, take it as soon as you can. If it is almost time for your next dose, take only that dose. Do not take double or extra doses.  What may interact with this medicine?  Do not take this medicine with any of the following medications:  · cisapride  · dronedarone  · pimozide  · thioridazine  This medicine may also interact with the following medications:  · alcohol  · antihistamines for allergy, cough, and cold  · atropine  · barbiturate medicines for sleep or seizures, like phenobarbital  · certain antibiotics like erythromycin or clarithromycin  · certain medicines for anxiety or sleep  · certain medicines for bladder problems like oxybutynin, tolterodine  · certain medicines for depression or psychotic disturbances  · certain medicines for irregular heart beat  · certain medicines for Parkinson's disease like benztropine, trihexyphenidyl  · certain medicines for seizures like phenobarbital, primidone  · certain medicines for stomach problems like dicyclomine, hyoscyamine  · certain medicines for travel sickness like scopolamine  · ipratropium  · narcotic medicines for pain  · other medicines that prolong the QT interval (an abnormal heart rhythm) like dofetilide  This list may not describe all possible interactions. Give your health care provider a list of all the medicines, herbs, non-prescription drugs, or dietary supplements you use. Also tell them if you smoke, drink alcohol, or use illegal drugs. Some items may interact with your medicine.  What should I watch for while using this medicine?  Tell your doctor or health care professional if your symptoms do not improve.  You may get drowsy or dizzy. Do not drive, use machinery, or do anything that needs mental alertness until you know how this medicine affects you. Do not stand or sit up quickly, especially if you are an older patient. This reduces the risk  of dizzy or fainting spells. Alcohol may interfere with the effect of this medicine. Avoid alcoholic drinks.  Your mouth may get dry. Chewing sugarless gum or sucking hard candy, and drinking plenty of water may help. Contact your doctor if the problem does not go away or is severe.  This medicine may cause dry eyes and blurred vision. If you wear contact lenses you may feel some discomfort. Lubricating drops may help. See your eye doctor if the problem does not go away or is severe.  If you are receiving skin tests for allergies, tell your doctor you are using this medicine.  What side effects may I notice from receiving this medicine?  Side effects that you should report to your doctor or health care professional as soon as possible:  · allergic reactions like skin rash, itching or hives, swelling of the face, lips, or tongue  · changes in vision  · confusion  · fast, irregular heartbeat  · seizures  · tremor  · trouble passing urine or change in the amount of urine  Side effects that usually do not require medical attention (report to your doctor or health care professional if they continue or are bothersome):  · constipation  · drowsiness  · dry mouth  · headache  · tiredness  This list may not describe all possible side effects. Call your doctor for medical advice about side effects. You may report side effects to FDA at 0-131-FDA-4208.  Where should I keep my medicine?  Keep out of the reach of children.  Store at room temperature between 15 and 30 degrees C (59 and 86 degrees F). Keep container tightly closed. Throw away any unused medicine after the expiration date.  NOTE: This sheet is a summary. It may not cover all possible information. If you have questions about this medicine, talk to your doctor, pharmacist, or health care provider.  © 2021 [employer] (2019-12-09 13:19:55)    Acne    Acne is a skin problem that causes pimples and other skin changes. The skin has many tiny openings called  pores. Each pore contains an oil gland. Oil glands make an oily substance that is called sebum. Acne occurs when the pores in the skin get blocked. The pores may become infected with bacteria, or they may become red, sore, and swollen. Acne is a common skin problem, especially for teenagers. It often occurs on the face, neck, chest, upper arms, and back. Acne usually goes away over time.  What are the causes?  Acne is caused when oil glands get blocked with sebum, dead skin cells, and dirt. The bacteria that are normally found in the oil glands then multiply and cause inflammation.  Acne is commonly triggered by changes in your hormones. These hormonal changes can cause the oil glands to get bigger and to make more sebum. Factors that can make acne worse include:  · Hormone changes during:  ? Adolescence.  ? Women's menstrual cycles.  ? Pregnancy.  · Oil-based cosmetics and hair products.  · Stress.  · Hormone problems that are caused by certain diseases.  · Certain medicines.  · Pressure from headbands, backpacks, or shoulder pads.  · Exposure to certain oils and chemicals.  · Eating a diet high in carbohydrates that quickly turn to sugar. These include dairy products, desserts, and chocolates.  What increases the risk?  This condition is more likely to develop in:  · Teenagers.  · People who have a family history of acne.  What are the signs or symptoms?  Symptoms include:  · Small, red bumps (pimples or papules).  · Whiteheads.  · Blackheads.  · Small, pus-filled pimples (pustules).  · Big, red pimples or pustules that feel tender.  More severe acne can cause:  · An abscess. This is an infected area that contains a collection of pus.  · Cysts. These are hard, painful, fluid-filled sacs.  · Scars. These can happen after large pimples heal.  How is this diagnosed?  This condition is diagnosed with a medical history and physical exam. Blood tests may also be done.  How is this treated?  Treatment for this condition  can vary depending on the severity of your acne. Treatment may include:  · Creams and lotions that prevent oil glands from clogging.  · Creams and lotions that treat or prevent infections and inflammation.  · Antibiotic medicines that are applied to the skin or taken as a pill.  · Pills that decrease sebum production.  · Birth control pills.  · Light or laser treatments.  · Injections of medicine into the affected areas.  · Chemicals that cause peeling of the skin.  · Surgery.  Your health care provider will also recommend the best way to take care of your skin. Good skin care is the most important part of treatment.  Follow these instructions at home:  Skin care  Take care of your skin as told by your health care provider. You may be told to do these things:  · Wash your skin gently at least two times each day, as well as:  ? After you exercise.  ? Before you go to bed.  · Use mild soap.  · Apply a water-based skin moisturizer after you wash your skin.  · Use a sunscreen or sunblock with SPF 30 or greater. This is especially important if you are using acne medicines.  · Choose cosmetics that will not block your oil glands (are noncomedogenic).  Medicines  · Take over-the-counter and prescription medicines only as told by your health care provider.  · If you were prescribed an antibiotic medicine, apply it or take it as told by your health care provider. Do not stop using the antibiotic even if your condition improves.  General instructions  · Keep your hair clean and off your face. If you have oily hair, shampoo your hair regularly or daily.  · Avoid wearing tight headbands or hats.  · Avoid picking or squeezing your pimples. That can make your acne worse and cause scarring.  · Shave gently and only when necessary.  · Keep a food journal to figure out if any foods are linked to your acne. Avoid dairy products, desserts, and chocolates.  · Take steps to manage and reduce stress.  · Keep all follow-up visits as told  by your health care provider. This is important.  Contact a health care provider if:  · Your acne is not better after eight weeks.  · Your acne gets worse.  · You have a large area of skin that is red or tender.  · You think that you are having side effects from any acne medicine.  Summary  · Acne is a skin problem that causes pimples and other skin changes. Acne is a common skin problem, especially for teenagers. Acne usually goes away over time.  · Acne is commonly triggered by changes in your hormones. There are many other causes, such as stress, diet, and certain medicines.  · Follow your health care provider's instructions for how to take care of your skin. Good skin care is the most important part of treatment.  · Take over-the-counter and prescription medicines only as told by your health care provider.  · Contact your health care provider if you think that you are having side effects from any acne medicine.  This information is not intended to replace advice given to you by your health care provider. Make sure you discuss any questions you have with your health care provider.  Document Revised: 04/30/2019 Document Reviewed: 04/30/2019  Elsevier Patient Education © 2021 [employer]

## 2021-03-31 NOTE — PROGRESS NOTES
Follow Up Office Note     Patient Name: Boston Fenton  : 2000   MRN: [id number]     Chief Complaint:    Chief Complaint   Patient presents with   • Anxiety     follow up       History of Present Illness: Boston Fenton is a 20 y.o. female who presents today for re-evaluation/management anxiety. She states that she is still having significant anxiety with some panic episodes despite starting Buspar. She states that she has had difficulty falling asleep since starting the medication. She is unsure if this is a side effect of the Buspar or if something else may be causing her insomnia.  Patient is also requesting a referral to dermatology for management persistent acne.  Additionally, patient has a new complaint today of nausea and abdominal cramping since yesterday. She denies vomiting.     Anxiety  Presents for follow-up visit. Symptoms include excessive worry, insomnia, malaise, nausea, nervous/anxious behavior and panic. Patient reports no chest pain, depressed mood, dizziness, palpitations, shortness of breath or suicidal ideas. The severity of symptoms is severe. The quality of sleep is poor.       Nausea  This is a new problem. The current episode started yesterday. The problem occurs daily. The problem has been unchanged. Associated symptoms include a change in bowel habit and nausea. Pertinent negatives include no chest pain, chills, diaphoresis, fatigue, fever, rash, urinary symptoms, vomiting or weakness. Abdominal pain: cramping. The symptoms are aggravated by eating. She has tried nothing for the symptoms.      Subjective      Review of Systems:   Review of Systems   Constitutional: Negative for activity change, appetite change, chills, diaphoresis, fatigue, fever and unexpected weight change.   Respiratory: Negative for chest tightness and shortness of breath.    Cardiovascular: Negative for chest pain, palpitations and leg swelling.   Gastrointestinal: Positive for change in bowel habit, diarrhea  "and nausea. Negative for blood in stool and vomiting. Abdominal pain: cramping.   Skin: Negative for rash.        acne   Neurological: Negative.  Negative for dizziness and weakness.   Psychiatric/Behavioral: Negative for dysphoric mood and suicidal ideas. The patient is nervous/anxious and has insomnia.         Past Medical History: History reviewed. No pertinent past medical history.      Medications:     Current Outpatient Medications:   •  busPIRone (BUSPAR) 5 MG tablet, Take 1 tablet by mouth 2 (Two) Times a Day., Disp: 60 tablet, Rfl: 1  •  Dihydrotachysterol (DHT PO), Take  by mouth., Disp: , Rfl:   •  hydrOXYzine (ATARAX) 25 MG tablet, Take 1 tablet by mouth At Night As Needed for Anxiety (insomnia)., Disp: 30 tablet, Rfl: 2  •  L-Tyrosine 500 MG tablet, Take  by mouth., Disp: , Rfl:   •  ondansetron ODT (ZOFRAN-ODT) 4 MG disintegrating tablet, Place 1 tablet on the tongue Every 8 (Eight) Hours As Needed for Nausea or Vomiting., Disp: 20 tablet, Rfl: 0    Allergies:   Allergies   Allergen Reactions   • Mucinex [Guaifenesin Er] Swelling         Objective     Physical Exam:  Vital Signs:   Vitals:    03/30/21 1718   BP: 102/72   BP Location: Right arm   Patient Position: Sitting   Cuff Size: Adult   Pulse: 100   Resp: 20   Temp: 98.2 °F (36.8 °C)   SpO2: 96%   Weight: 59 kg (130 lb)   Height: 170.2 cm (67\")   PainSc: 0-No pain     Body mass index is 20.36 kg/m².     Physical Exam  Vitals and nursing note reviewed.   Constitutional:       General: She is not in acute distress.     Appearance: Normal appearance. She is well-developed. She is not ill-appearing, toxic-appearing or diaphoretic.   HENT:      Head: Normocephalic and atraumatic.   Cardiovascular:      Rate and Rhythm: Normal rate and regular rhythm.      Heart sounds: Normal heart sounds.   Pulmonary:      Effort: Pulmonary effort is normal. No respiratory distress.      Breath sounds: Normal breath sounds. No stridor. No wheezing.   Abdominal:      " General: Bowel sounds are normal. There is no distension.      Palpations: Abdomen is soft.      Tenderness: There is no abdominal tenderness.   Skin:     General: Skin is warm and dry.   Neurological:      General: No focal deficit present.      Mental Status: She is alert and oriented to person, place, and time.   Psychiatric:         Attention and Perception: Attention and perception normal.         Mood and Affect: Mood and affect normal.         Speech: Speech normal.         Behavior: Behavior normal. Behavior is cooperative.         Thought Content: Thought content normal.         Cognition and Memory: Cognition and memory normal.         Judgment: Judgment normal.         Assessment / Plan      Assessment/Plan:   Diagnoses and all orders for this visit:    1. Anxiety (Primary)  -     hydrOXYzine (ATARAX) 25 MG tablet; Take 1 tablet by mouth At Night As Needed for Anxiety (insomnia).  Dispense: 30 tablet; Refill: 2        -    Continue Buspar at current dosage. Try taking Buspar earlier in the day (before 6 pm).    2. Nausea  -     ondansetron ODT (ZOFRAN-ODT) 4 MG disintegrating tablet; Place 1 tablet on the tongue Every 8 (Eight) Hours As Needed for Nausea or Vomiting.  Dispense: 20 tablet; Refill: 0    3. Insomnia, unspecified type  -     hydrOXYzine (ATARAX) 25 MG tablet; Take 1 tablet by mouth At Night As Needed for Anxiety (insomnia).  Dispense: 30 tablet; Refill: 2    4. Acne, unspecified acne type  -     Ambulatory Referral to Dermatology       Follow Up:   4-6 weeks.    Discussed the nature of the medical condition(s) risks, complications, implications, management, safe and proper use of medications. Encouraged medication compliance, and keeping scheduled follow up appointments with me and any other providers.      RTC if symptoms fail to improve, to ER if symptoms worsen.      ANGELES Duran  [facility] [place]       Please note that portions of this note may have been completed  with a voice recognition program. Efforts were made to edit the dictations, but occasionally words are mistranscribed.

## 2021-08-11 PROCEDURE — U0004 COV-19 TEST NON-CDC HGH THRU: HCPCS | Performed by: NURSE PRACTITIONER

## 2021-08-15 DIAGNOSIS — F41.9 ANXIETY: ICD-10-CM

## 2021-08-15 DIAGNOSIS — G47.00 INSOMNIA, UNSPECIFIED TYPE: ICD-10-CM

## 2021-08-16 RX ORDER — BUSPIRONE HYDROCHLORIDE 5 MG/1
5 TABLET ORAL 2 TIMES DAILY
Qty: 60 TABLET | Refills: 1 | Status: SHIPPED | OUTPATIENT
Start: 2021-08-16 | End: 2022-02-17

## 2021-08-16 RX ORDER — HYDROXYZINE HYDROCHLORIDE 25 MG/1
25 TABLET, FILM COATED ORAL NIGHTLY PRN
Qty: 30 TABLET | Refills: 2 | Status: SHIPPED | OUTPATIENT
Start: 2021-08-16 | End: 2022-03-28 | Stop reason: SDUPTHER

## 2022-01-28 ENCOUNTER — HOSPITAL ENCOUNTER (EMERGENCY)
Facility: HOSPITAL | Age: 22
Discharge: HOME | End: 2022-01-28
Attending: EMERGENCY MEDICINE | Admitting: EMERGENCY MEDICINE

## 2022-01-28 VITALS
SYSTOLIC BLOOD PRESSURE: 122 MMHG | RESPIRATION RATE: 18 BRPM | BODY MASS INDEX: 22.49 KG/M2 | HEART RATE: 94 BPM | HEIGHT: 65 IN | WEIGHT: 135 LBS | DIASTOLIC BLOOD PRESSURE: 75 MMHG | TEMPERATURE: 98.6 F | OXYGEN SATURATION: 100 %

## 2022-01-28 DIAGNOSIS — G44.40 HEADACHE AFTER VACCINATION: ICD-10-CM

## 2022-01-28 DIAGNOSIS — R50.83 FEVER AFTER COVID-19 VACCINATION: ICD-10-CM

## 2022-01-28 DIAGNOSIS — M79.10 MYALGIA AFTER COVID-19 VACCINATION: ICD-10-CM

## 2022-01-28 DIAGNOSIS — T50.B95A FEVER AFTER COVID-19 VACCINATION: ICD-10-CM

## 2022-01-28 DIAGNOSIS — T50.B95A FATIGUE AFTER COVID-19 VACCINATION: Primary | ICD-10-CM

## 2022-01-28 DIAGNOSIS — R53.83 FATIGUE AFTER COVID-19 VACCINATION: Primary | ICD-10-CM

## 2022-01-28 DIAGNOSIS — T50.Z95A HEADACHE AFTER VACCINATION: ICD-10-CM

## 2022-01-28 DIAGNOSIS — T50.B95A MYALGIA AFTER COVID-19 VACCINATION: ICD-10-CM

## 2022-01-28 LAB
FLUAV SUBTYP SPEC NAA+PROBE: NOT DETECTED
FLUBV RNA ISLT QL NAA+PROBE: NOT DETECTED
SARS-COV-2 RNA PNL SPEC NAA+PROBE: NOT DETECTED

## 2022-01-28 PROCEDURE — C9803 HOPD COVID-19 SPEC COLLECT: HCPCS

## 2022-01-28 PROCEDURE — 99283 EMERGENCY DEPT VISIT LOW MDM: CPT

## 2022-01-28 PROCEDURE — 87636 SARSCOV2 & INF A&B AMP PRB: CPT | Performed by: PHYSICIAN ASSISTANT

## 2022-01-28 RX ORDER — BUTALBITAL, ACETAMINOPHEN AND CAFFEINE 50; 325; 40 MG/1; MG/1; MG/1
2 TABLET ORAL ONCE
Status: COMPLETED | OUTPATIENT
Start: 2022-01-28 | End: 2022-01-28

## 2022-01-28 RX ORDER — BUTALBITAL, ACETAMINOPHEN AND CAFFEINE 50; 325; 40 MG/1; MG/1; MG/1
1-2 TABLET ORAL EVERY 6 HOURS PRN
Qty: 24 TABLET | Refills: 0 | Status: SHIPPED | OUTPATIENT
Start: 2022-01-28 | End: 2022-02-17

## 2022-01-28 RX ADMIN — BUTALBITAL, ACETAMINOPHEN, AND CAFFEINE 2 TABLET: 50; 325; 40 TABLET ORAL at 19:11

## 2022-02-17 ENCOUNTER — OFFICE VISIT (OUTPATIENT)
Dept: OBSTETRICS AND GYNECOLOGY | Facility: CLINIC | Age: 22
End: 2022-02-17

## 2022-02-17 VITALS
WEIGHT: 148.2 LBS | BODY MASS INDEX: 24.69 KG/M2 | SYSTOLIC BLOOD PRESSURE: 120 MMHG | HEIGHT: 65 IN | DIASTOLIC BLOOD PRESSURE: 72 MMHG

## 2022-02-17 DIAGNOSIS — R63.5 WEIGHT GAIN: ICD-10-CM

## 2022-02-17 DIAGNOSIS — Z01.411 ENCOUNTER FOR GYNECOLOGICAL EXAMINATION WITH ABNORMAL FINDING: Primary | ICD-10-CM

## 2022-02-17 PROCEDURE — 3008F BODY MASS INDEX DOCD: CPT | Performed by: NURSE PRACTITIONER

## 2022-02-17 PROCEDURE — 99395 PREV VISIT EST AGE 18-39: CPT | Performed by: NURSE PRACTITIONER

## 2022-02-17 PROCEDURE — 2014F MENTAL STATUS ASSESS: CPT | Performed by: NURSE PRACTITIONER

## 2022-02-17 NOTE — PROGRESS NOTES
"Chief Complaint  Boston Fenton is a 21 y.o.  female presenting for Gynecologic Exam (New GYN, establish care.  patient currently involved in egg donation for family friend.)    History of Present Illness  Very pleasant young 20yo woman, presenting for establishing gyn care.  She is on COCPs x only ~ 2 months and c/o excessive wt gain on them.  (She believes that she has gained > 25#/ past 2 mo)  Also states this happened once before (when she was in high school), that she had ~30# wt gain in 2 mo on COCPs.  She is going to be egg donor for family friends, so she will be starting ovulation induction med after the current pk of pills.  She is not SA.  No vaginitis sx.  No pelvic or abd pain.  ROS neg.    The following portions of the patient's history were reviewed and updated as appropriate: allergies, current medications, past family history, past medical history, past social history, past surgical history and problem list.    Allergies   Allergen Reactions   • Mucinex [Guaifenesin Er] Swelling         Current Outpatient Medications:   •  hydrOXYzine (ATARAX) 25 MG tablet, Take 1 tablet by mouth At Night As Needed for Anxiety (insomnia)., Disp: 30 tablet, Rfl: 2  •  Norgestimate-Eth Estradiol (SPRINTEC 28 PO), Take  by mouth., Disp: , Rfl:     Past Medical History:   Diagnosis Date   • Anxiety         Past Surgical History:   Procedure Laterality Date   • TONSILLECTOMY         Objective  /72   Ht 165.1 cm (65\")   Wt 67.2 kg (148 lb 3.2 oz)   LMP 2022 (Exact Date)   Breastfeeding No   BMI 24.66 kg/m²     Physical Exam  Exam conducted with a chaperone present.   Constitutional:       Appearance: Normal appearance.   HENT:      Head: Normocephalic.   Neck:      Thyroid: No thyroid mass or thyromegaly.   Cardiovascular:      Rate and Rhythm: Normal rate and regular rhythm.      Heart sounds: Normal heart sounds.   Pulmonary:      Effort: Pulmonary effort is normal.      Breath sounds: Normal breath " sounds.   Chest:   Breasts:      Right: No inverted nipple, mass, nipple discharge, axillary adenopathy or supraclavicular adenopathy.      Left: No inverted nipple, mass, nipple discharge, axillary adenopathy or supraclavicular adenopathy.       Abdominal:      Palpations: Abdomen is soft. There is no mass.      Tenderness: There is no abdominal tenderness.   Genitourinary:     General: Normal vulva.      Labia:         Right: No lesion.         Left: No lesion.       Vagina: Normal. No erythema.      Cervix: No discharge, lesion or erythema.      Uterus: Not enlarged and not tender.       Adnexa:         Right: No mass or tenderness.          Left: No mass or tenderness.        Comments: Anus appears wnl.  No rectal exam performed.  Lymphadenopathy:      Upper Body:      Right upper body: No supraclavicular or axillary adenopathy.      Left upper body: No supraclavicular or axillary adenopathy.   Neurological:      Mental Status: She is alert.         Assessment/Plan   Diagnoses and all orders for this visit:    1. Encounter for gynecological examination with abnormal finding (Primary)    2. Weight gain    Pt will work hard on dieting & exercise for getting the wt back off.  Couns re: SBE.      Procedures        Return in about 1 year (around 2/17/2023) for Annual physical.    Tressa Johnson, APRN  02/17/2022

## 2022-03-28 DIAGNOSIS — G47.00 INSOMNIA, UNSPECIFIED TYPE: ICD-10-CM

## 2022-03-28 DIAGNOSIS — F41.9 ANXIETY: ICD-10-CM

## 2022-03-29 ENCOUNTER — OFFICE VISIT (OUTPATIENT)
Dept: OBSTETRICS AND GYNECOLOGY | Facility: CLINIC | Age: 22
End: 2022-03-29

## 2022-03-29 VITALS
HEIGHT: 65 IN | DIASTOLIC BLOOD PRESSURE: 70 MMHG | SYSTOLIC BLOOD PRESSURE: 102 MMHG | BODY MASS INDEX: 24.32 KG/M2 | WEIGHT: 146 LBS

## 2022-03-29 DIAGNOSIS — R87.612 LGSIL ON PAP SMEAR OF CERVIX: Primary | ICD-10-CM

## 2022-03-29 PROCEDURE — 57454 BX/CURETT OF CERVIX W/SCOPE: CPT | Performed by: NURSE PRACTITIONER

## 2022-03-29 RX ORDER — HYDROXYZINE HYDROCHLORIDE 25 MG/1
25 TABLET, FILM COATED ORAL NIGHTLY PRN
Qty: 30 TABLET | Refills: 2 | Status: SHIPPED | OUTPATIENT
Start: 2022-03-29 | End: 2022-05-02 | Stop reason: SDUPTHER

## 2022-03-29 NOTE — PROGRESS NOTES
"Chief Complaint  Boston Fenton is a 21 y.o.  female presenting for Procedure (colposcopy)    History of Present Illness  Very pleasant 20yo here with recent LSIL pap.  (Negative for gonorrhea, chlamydia, and trich.)  We discussed the low probability for progressing to CIN3+ within 5 yrs (3%).  We discussed the options of simply repeating the pap in one (1) year, vs proceeding with colpo today.  She would prefer to go ahead & look / bx if dysplasia seems to be apparent.  LMP 3/24/2022.  Denies any risk of pregnancy.      The following portions of the patient's history were reviewed and updated as appropriate: allergies, current medications, past family history, past medical history, past social history, past surgical history and problem list.    Allergies   Allergen Reactions   • Mucinex [Guaifenesin Er] Swelling         Current Outpatient Medications:   •  hydrOXYzine (ATARAX) 25 MG tablet, Take 1 tablet by mouth At Night As Needed for Anxiety (insomnia). Needs appointment for further refills., Disp: 30 tablet, Rfl: 2  •  Norgestimate-Eth Estradiol (SPRINTEC 28 PO), Take  by mouth., Disp: , Rfl:     Past Medical History:   Diagnosis Date   • Anxiety         Past Surgical History:   Procedure Laterality Date   • FERTILITY SURGERY      egg retrieval   • TONSILLECTOMY         Objective  /70   Ht 165.1 cm (65\")   Wt 66.2 kg (146 lb)   LMP 2022 (Exact Date)   Breastfeeding No   BMI 24.30 kg/m²     Physical Exam  Constitutional:       Appearance: Normal appearance.   Pulmonary:      Effort: Pulmonary effort is normal.   Skin:     General: Skin is warm and dry.   Neurological:      Mental Status: She is alert and oriented to person, place, and time.   Psychiatric:         Mood and Affect: Mood normal.         Behavior: Behavior normal.         Assessment/Plan   Diagnoses and all orders for this visit:    1. LGSIL on Pap smear of cervix (Primary)    Other orders  -     " Colposcopy        Colposcopy    Date/Time: 3/29/2022 3:57 PM  Performed by: Tressa Johnson APRN  Authorized by: Tressa Johnson APRN   Local anesthesia used: no    Anesthesia:  Local anesthesia used: no    Sedation:  Patient sedated: no    Patient tolerance: patient tolerated the procedure well with no immediate complications  Comments: External genitalia without lesions; vagina with 1+ menstrual blood; cx without leukoplakia or atypical vessels; I am able to view all 360 degrees of the current SCJ.  There is fairly thin, but well demarcated AW lesion around most of the SCJ.  ECC taken.  Bx x 1 taken at 9:00.  Hemostasis with Monsel's solution.  She tolerated the procedure well, and left the office in good condition.  She verbalized good understanding of vaginal precautions x 1 wk (no SA / no tampons)                  Return for Next scheduled follow up.    ANGELES Love  03/29/2022

## 2022-03-30 DIAGNOSIS — R87.612 LGSIL ON PAP SMEAR OF CERVIX: ICD-10-CM

## 2022-04-05 ENCOUNTER — OFFICE VISIT (OUTPATIENT)
Dept: FAMILY MEDICINE CLINIC | Facility: CLINIC | Age: 22
End: 2022-04-05

## 2022-04-05 VITALS
HEIGHT: 65 IN | HEART RATE: 78 BPM | SYSTOLIC BLOOD PRESSURE: 100 MMHG | WEIGHT: 137 LBS | TEMPERATURE: 98 F | OXYGEN SATURATION: 98 % | BODY MASS INDEX: 22.82 KG/M2 | RESPIRATION RATE: 22 BRPM | DIASTOLIC BLOOD PRESSURE: 66 MMHG

## 2022-04-05 DIAGNOSIS — F51.04 PSYCHOPHYSIOLOGICAL INSOMNIA: Chronic | ICD-10-CM

## 2022-04-05 DIAGNOSIS — F41.9 ANXIETY: Chronic | ICD-10-CM

## 2022-04-05 DIAGNOSIS — Z00.00 ANNUAL PHYSICAL EXAM: Primary | ICD-10-CM

## 2022-04-05 DIAGNOSIS — Z11.59 NEED FOR HEPATITIS C SCREENING TEST: ICD-10-CM

## 2022-04-05 PROCEDURE — 3008F BODY MASS INDEX DOCD: CPT | Performed by: NURSE PRACTITIONER

## 2022-04-05 PROCEDURE — 99395 PREV VISIT EST AGE 18-39: CPT | Performed by: NURSE PRACTITIONER

## 2022-04-05 PROCEDURE — 2014F MENTAL STATUS ASSESS: CPT | Performed by: NURSE PRACTITIONER

## 2022-04-05 NOTE — PROGRESS NOTES
Follow Up Office Note     Patient Name: Boston Fenton  : 2000   MRN: [id number]     Chief Complaint:    Chief Complaint   Patient presents with   • Annual Exam   • Anxiety       History of Present Illness: Boston Fenton is a 21 y.o. female who presents today for annual physical exam.  Patient describes her overall health as good.  Patient has chronic anxiety for which she takes hydroxyzine as needed.  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 Patient has no complaints or health concerns today.  She is not fasting today, but is amenable to return at a later date for fasting labs.      Subjective      Review of Systems:   Review of Systems   Constitutional: Negative for activity change, appetite change, chills, diaphoresis, fatigue, fever and unexpected weight change.   HENT: Negative.    Eyes: Negative.    Respiratory: Negative.    Cardiovascular: Negative.    Gastrointestinal: Negative.    Genitourinary: Negative.    Musculoskeletal: Negative.    Skin: Negative.    Neurological: Negative.    Psychiatric/Behavioral: Positive for sleep disturbance (occasional, takes hydroxyzine to help with sleep and anxiety). Negative for dysphoric mood and suicidal ideas. The patient is nervous/anxious.         Past Medical History:   Past Medical History:   Diagnosis Date   • Anxiety      Patient's last menstrual period was 2022.    Medications:     Current Outpatient Medications:   •  hydrOXYzine (ATARAX) 25 MG tablet, Take 1 tablet by mouth At Night As Needed for Anxiety (insomnia). Needs appointment for further refills., Disp: 30 tablet, Rfl: 2    Allergies:   Allergies   Allergen Reactions   • Mucinex [Guaifenesin Er] Swelling     PHQ-2/PHQ-9 Depression Screening 2022   Retired Total Score -   Little Interest or Pleasure in Doing Things 0-->not at all   Feeling Down, Depressed or Hopeless  "0-->not at all   PHQ-9: Brief Depression Severity Measure Score 0     ZULY-7  Feeling nervous, anxious or on edge: More than half the days  Not being able to stop or control worrying: More than half the days  Worrying too much about different things: More than half the days  Trouble Relaxing: More than half the days  Being so restless that it is hard to sit still: Several days  Feeling afraid as if something awful might happen: Not at all  Becoming easily annoyed or irritable: Not at all  ZULY 7 Total Score: 9  If you checked any problems, how difficult have these problems made it for you to do your work, take care of things at home, or get along with other people: Somewhat difficult          Objective     Physical Exam:  Vital Signs:   Vitals:    04/05/22 1449   BP: 100/66   Pulse: 78   Resp: 22   Temp: 98 °F (36.7 °C)   SpO2: 98%   Weight: 62.1 kg (137 lb)   Height: 165.1 cm (65\")   PainSc: 0-No pain     Body mass index is 22.8 kg/m².     Physical Exam  Vitals and nursing note reviewed. Exam conducted with a chaperone present.   Constitutional:       General: She is not in acute distress.     Appearance: Normal appearance. She is well-developed and well-groomed. She is not ill-appearing, toxic-appearing or diaphoretic.   HENT:      Head: Normocephalic and atraumatic.      Right Ear: Tympanic membrane, ear canal and external ear normal.      Left Ear: Tympanic membrane, ear canal and external ear normal.      Nose: Nose normal.      Mouth/Throat:      Lips: Pink.      Mouth: Mucous membranes are moist.      Pharynx: Oropharynx is clear. Uvula midline. No posterior oropharyngeal erythema.   Neck:      Thyroid: No thyroid mass, thyromegaly or thyroid tenderness.   Cardiovascular:      Rate and Rhythm: Normal rate and regular rhythm.      Pulses: Normal pulses.      Heart sounds: Normal heart sounds, S1 normal and S2 normal. No murmur heard.  Pulmonary:      Effort: Pulmonary effort is normal. No respiratory distress. "      Breath sounds: Normal breath sounds.   Abdominal:      General: Bowel sounds are normal. There is no distension.      Palpations: Abdomen is soft.      Tenderness: There is no abdominal tenderness.   Musculoskeletal:         General: Normal range of motion.      Cervical back: Normal range of motion and neck supple.      Right lower leg: No edema.      Left lower leg: No edema.   Lymphadenopathy:      Cervical: No cervical adenopathy.   Skin:     General: Skin is warm and dry.      Capillary Refill: Capillary refill takes less than 2 seconds.      Findings: No rash.   Neurological:      General: No focal deficit present.      Mental Status: She is alert and oriented to person, place, and time.   Psychiatric:         Attention and Perception: Attention and perception normal.         Mood and Affect: Mood and affect normal.         Speech: Speech normal.         Behavior: Behavior normal. Behavior is cooperative.         Thought Content: Thought content normal.         Cognition and Memory: Cognition and memory normal.         Judgment: Judgment normal.         Assessment / Plan      Assessment/Plan:   Diagnoses and all orders for this visit:    1. Annual physical exam (Primary)  -     Comprehensive Metabolic Panel; Future  -     TSH; Future  -     CBC Auto Differential; Future  -     Lipid Panel; Future    2. Need for hepatitis C screening test  -     Hepatitis C Antibody; Future    3. Anxiety  Assessment & Plan:  Anxiety symptoms worse recently, patient believes it is situational.  Continue hydroxyzine. May increase to 2 tablets prn.      4. Psychophysiological insomnia  Assessment & Plan:  Insomnia stable and well controlled with as needed hydroxyzine.  Continue current treatment plan.       Healthy appearing young adult.  Laboratory studies per orders, further recommendation after laboratory evaluation.    Follow Up:   PRN and at next scheduled appointment(s) with PCP.    Discussed the nature of the medical  condition(s) risks, complications, implications, management, safe and proper use of medications. Encouraged medication compliance, and keeping scheduled follow up appointments with me and any other providers.      The patient is here for a health maintenance visit.  Currently, the patient consumes a healthy diet and has an adequate exercise regimen. Screening lab work is ordered.  Immunizations are declined today and vaccine education is provided.  Advice and education is given regarding nutrition, aerobic exercise, routine dental evaluations, routine eye exams, reproductive health, cardiovascular risk reduction, sunscreen use, self skin examination (annual dermatology evaluations) and seat belt use (general overall safety).  Further recommendations after lab evaluation.  Annual wellness evaluations recommended.      ANGELES Duran  [facility] [place]

## 2022-04-06 NOTE — ASSESSMENT & PLAN NOTE
Anxiety symptoms worse recently, patient believes it is situational.  Continue hydroxyzine. May increase to 2 tablets prn.

## 2022-05-02 DIAGNOSIS — G47.00 INSOMNIA, UNSPECIFIED TYPE: ICD-10-CM

## 2022-05-02 DIAGNOSIS — F41.9 ANXIETY: ICD-10-CM

## 2022-05-03 RX ORDER — HYDROXYZINE HYDROCHLORIDE 25 MG/1
25 TABLET, FILM COATED ORAL NIGHTLY PRN
Qty: 30 TABLET | Refills: 2 | Status: SHIPPED | OUTPATIENT
Start: 2022-05-03

## 2022-05-03 NOTE — TELEPHONE ENCOUNTER
Last Office Visit: 04/05/22  Next Office Visit: N/A  Last Refill Date: 03/29/22  Quantity: 30  Refills: 2

## 2022-08-11 ENCOUNTER — TELEPHONE (OUTPATIENT)
Dept: FAMILY MEDICINE CLINIC | Facility: CLINIC | Age: 22
End: 2022-08-11

## 2022-10-30 DIAGNOSIS — R53.82 CHRONIC FATIGUE: ICD-10-CM

## 2022-10-30 DIAGNOSIS — F41.9 ANXIETY: Primary | ICD-10-CM

## 2022-12-20 ENCOUNTER — LAB (OUTPATIENT)
Dept: LAB | Facility: HOSPITAL | Age: 22
End: 2022-12-20

## 2022-12-20 DIAGNOSIS — R53.82 CHRONIC FATIGUE: ICD-10-CM

## 2022-12-20 DIAGNOSIS — Z11.59 NEED FOR HEPATITIS C SCREENING TEST: ICD-10-CM

## 2022-12-20 DIAGNOSIS — F41.9 ANXIETY: ICD-10-CM

## 2022-12-20 DIAGNOSIS — Z00.00 ANNUAL PHYSICAL EXAM: ICD-10-CM

## 2022-12-20 LAB
25(OH)D3 SERPL-MCNC: 32.3 NG/ML (ref 30–100)
ALBUMIN SERPL-MCNC: 4.6 G/DL (ref 3.5–5.2)
ALBUMIN/GLOB SERPL: 1.9 G/DL
ALP SERPL-CCNC: 77 U/L (ref 39–117)
ALT SERPL W P-5'-P-CCNC: 14 U/L (ref 1–33)
ANION GAP SERPL CALCULATED.3IONS-SCNC: 8.9 MMOL/L (ref 5–15)
AST SERPL-CCNC: 22 U/L (ref 1–32)
BASOPHILS # BLD AUTO: 0.02 10*3/MM3 (ref 0–0.2)
BASOPHILS NFR BLD AUTO: 0.3 % (ref 0–1.5)
BILIRUB SERPL-MCNC: 0.4 MG/DL (ref 0–1.2)
BUN SERPL-MCNC: 10 MG/DL (ref 6–20)
BUN/CREAT SERPL: 14.1 (ref 7–25)
CALCIUM SPEC-SCNC: 9.5 MG/DL (ref 8.6–10.5)
CHLORIDE SERPL-SCNC: 104 MMOL/L (ref 98–107)
CHOLEST SERPL-MCNC: 199 MG/DL (ref 0–200)
CO2 SERPL-SCNC: 25.1 MMOL/L (ref 22–29)
CREAT SERPL-MCNC: 0.71 MG/DL (ref 0.57–1)
DEPRECATED RDW RBC AUTO: 45.9 FL (ref 37–54)
EGFRCR SERPLBLD CKD-EPI 2021: 123.5 ML/MIN/1.73
EOSINOPHIL # BLD AUTO: 0.08 10*3/MM3 (ref 0–0.4)
EOSINOPHIL NFR BLD AUTO: 1.3 % (ref 0.3–6.2)
ERYTHROCYTE [DISTWIDTH] IN BLOOD BY AUTOMATED COUNT: 14.1 % (ref 12.3–15.4)
GLOBULIN UR ELPH-MCNC: 2.4 GM/DL
GLUCOSE SERPL-MCNC: 100 MG/DL (ref 65–99)
HCT VFR BLD AUTO: 37.2 % (ref 34–46.6)
HCV AB SER DONR QL: NORMAL
HDLC SERPL-MCNC: 73 MG/DL (ref 40–60)
HGB BLD-MCNC: 11.9 G/DL (ref 12–15.9)
IMM GRANULOCYTES # BLD AUTO: 0.01 10*3/MM3 (ref 0–0.05)
IMM GRANULOCYTES NFR BLD AUTO: 0.2 % (ref 0–0.5)
LDLC SERPL CALC-MCNC: 102 MG/DL (ref 0–100)
LDLC/HDLC SERPL: 1.35 {RATIO}
LYMPHOCYTES # BLD AUTO: 1.68 10*3/MM3 (ref 0.7–3.1)
LYMPHOCYTES NFR BLD AUTO: 27.4 % (ref 19.6–45.3)
MCH RBC QN AUTO: 27.8 PG (ref 26.6–33)
MCHC RBC AUTO-ENTMCNC: 32 G/DL (ref 31.5–35.7)
MCV RBC AUTO: 86.9 FL (ref 79–97)
MONOCYTES # BLD AUTO: 0.38 10*3/MM3 (ref 0.1–0.9)
MONOCYTES NFR BLD AUTO: 6.2 % (ref 5–12)
NEUTROPHILS NFR BLD AUTO: 3.96 10*3/MM3 (ref 1.7–7)
NEUTROPHILS NFR BLD AUTO: 64.6 % (ref 42.7–76)
NRBC BLD AUTO-RTO: 0 /100 WBC (ref 0–0.2)
PLATELET # BLD AUTO: 195 10*3/MM3 (ref 140–450)
PMV BLD AUTO: 12.1 FL (ref 6–12)
POTASSIUM SERPL-SCNC: 4.2 MMOL/L (ref 3.5–5.2)
PROT SERPL-MCNC: 7 G/DL (ref 6–8.5)
RBC # BLD AUTO: 4.28 10*6/MM3 (ref 3.77–5.28)
SODIUM SERPL-SCNC: 138 MMOL/L (ref 136–145)
TRIGL SERPL-MCNC: 139 MG/DL (ref 0–150)
TSH SERPL DL<=0.05 MIU/L-ACNC: 1.79 UIU/ML (ref 0.27–4.2)
VLDLC SERPL-MCNC: 24 MG/DL (ref 5–40)
WBC NRBC COR # BLD: 6.13 10*3/MM3 (ref 3.4–10.8)

## 2022-12-20 PROCEDURE — 82306 VITAMIN D 25 HYDROXY: CPT

## 2022-12-20 PROCEDURE — 80050 GENERAL HEALTH PANEL: CPT

## 2022-12-20 PROCEDURE — 86803 HEPATITIS C AB TEST: CPT

## 2022-12-20 PROCEDURE — 80061 LIPID PANEL: CPT

## 2023-06-08 ENCOUNTER — TELEMEDICINE (OUTPATIENT)
Dept: FAMILY MEDICINE CLINIC | Facility: TELEHEALTH | Age: 23
End: 2023-06-08
Payer: MEDICAID

## 2023-06-08 DIAGNOSIS — R11.0 NAUSEA: Primary | ICD-10-CM

## 2023-06-08 RX ORDER — CLINDAMYCIN PHOSPHATE, BENZOYL PEROXIDE 25; 10 MG/G; MG/G
GEL TOPICAL
COMMUNITY
Start: 2023-04-21

## 2023-06-08 RX ORDER — TRETINOIN 0.1 %
CREAM (GRAM) TOPICAL
COMMUNITY
Start: 2023-04-21

## 2023-06-08 RX ORDER — ONDANSETRON 8 MG/1
8 TABLET, ORALLY DISINTEGRATING ORAL EVERY 8 HOURS PRN
Qty: 18 TABLET | Refills: 0 | Status: SHIPPED | OUTPATIENT
Start: 2023-06-08

## 2023-06-08 RX ORDER — SPIRONOLACTONE 25 MG/1
TABLET ORAL
COMMUNITY
Start: 2023-05-11

## 2023-06-08 NOTE — PROGRESS NOTES
Subjective   Chief Complaint   Patient presents with    Nausea    Headache       Boston Fenton is a 23 y.o. female.     History of Present Illness  Patient reports recently flying back from [place] to [place] yesterday.  She spent 14 days in [place].  Since returning home she has experienced fatigue, headache and intermittent nausea.  She has had some mild cramping, but states she went to [place] for egg donation and is attributing the cramping to that procedure.  Denies fever, diarrhea, respiratory symptoms.  She thinks her symptoms may be due to the altitude change.  She has been taking left over Zofran as needed which has helped a lot but she is out of this prescription.  Nausea  This is a new problem. The current episode started yesterday. The problem occurs intermittently. The problem has been waxing and waning. Associated symptoms include abdominal pain (baseline), fatigue, headaches and nausea. Pertinent negatives include no anorexia, arthralgias, change in bowel habit, chest pain, chills, congestion, coughing, diaphoresis, fever, joint swelling, myalgias, neck pain, numbness, rash, sore throat, swollen glands, urinary symptoms, vertigo, visual change, vomiting or weakness. Nothing aggravates the symptoms. Treatments tried: zofran. The treatment provided significant relief.   Headache     Allergies   Allergen Reactions    Mucinex [Guaifenesin Er] Swelling       Past Medical History:   Diagnosis Date    Anxiety        Past Surgical History:   Procedure Laterality Date    FERTILITY SURGERY      egg retrieval    TONSILLECTOMY         Social History     Socioeconomic History    Marital status: Single   Tobacco Use    Smoking status: Never    Smokeless tobacco: Never   Vaping Use    Vaping Use: Never used   Substance and Sexual Activity    Alcohol use: Yes     Alcohol/week: 2.0 standard drinks     Types: 2 Glasses of wine per week     Comment: socially    Drug use: Never    Sexual activity: Not Currently      Partners: Male     Birth control/protection: None, OCP       Family History   Problem Relation Age of Onset    No Known Problems Mother     Hypertension Father     Hyperthyroidism Maternal Grandmother     No Known Problems Maternal Grandfather     No Known Problems Paternal Grandmother     No Known Problems Paternal Grandfather     Breast cancer Paternal Great-Grandmother          Current Outpatient Medications:     Clindamycin Phos-Benzoyl Perox 1.2-2.5 % gel, , Disp: , Rfl:     Retin-A 0.1 % cream, , Disp: , Rfl:     spironolactone (ALDACTONE) 25 MG tablet, , Disp: , Rfl:     benzoyl peroxide 2.5 % gel, , Disp: , Rfl:     clindamycin (CLINDAGEL) 1 % gel, , Disp: , Rfl:     hydrOXYzine (ATARAX) 25 MG tablet, Take 1 tablet by mouth At Night As Needed for Anxiety (insomnia). Needs appointment for further refills., Disp: 30 tablet, Rfl: 2    minocycline (MINOCIN,DYNACIN) 50 MG capsule, , Disp: , Rfl:     ondansetron ODT (ZOFRAN-ODT) 8 MG disintegrating tablet, Place 1 tablet on the tongue Every 8 (Eight) Hours As Needed for Nausea or Vomiting., Disp: 18 tablet, Rfl: 0      Review of Systems   Constitutional:  Positive for fatigue. Negative for chills, diaphoresis and fever.   HENT:  Negative for congestion, sore throat and swollen glands.    Respiratory:  Negative for cough, chest tightness, shortness of breath and wheezing.    Cardiovascular:  Negative for chest pain.   Gastrointestinal:  Positive for abdominal pain (baseline) and nausea. Negative for abdominal distention, anal bleeding, anorexia, blood in stool, change in bowel habit, constipation, diarrhea, rectal pain, vomiting, GERD and indigestion.   Musculoskeletal:  Negative for arthralgias, joint swelling, myalgias and neck pain.   Skin:  Negative for rash.   Neurological:  Positive for headache. Negative for vertigo, weakness and numbness.      There were no vitals filed for this visit.    Objective   Physical Exam  Constitutional:       General: She is not  in acute distress.     Appearance: Normal appearance. She is not ill-appearing, toxic-appearing or diaphoretic.   HENT:      Head: Normocephalic.      Nose: No congestion or rhinorrhea.      Mouth/Throat:      Lips: Pink.      Mouth: Mucous membranes are moist.   Pulmonary:      Effort: Pulmonary effort is normal.   Neurological:      Mental Status: She is alert and oriented to person, place, and time.        Procedures     Assessment & Plan   Diagnoses and all orders for this visit:    1. Nausea (Primary)  -     ondansetron ODT (ZOFRAN-ODT) 8 MG disintegrating tablet; Place 1 tablet on the tongue Every 8 (Eight) Hours As Needed for Nausea or Vomiting.  Dispense: 18 tablet; Refill: 0        Symptoms should begin to resolve now that you are at lower altitudes.  Tylenol as needed for headache.    If symptoms worsen or do not improve follow up with your PCP or visit your nearest Urgent Care Center or ER.    PLAN: Discussed dosing, side effects, recommended other symptomatic care.  Patient should follow up with primary care provider, Urgent Care or ER if symptoms worsen, fail to resolve or other symptoms need attention. Patient/family agree to the above.         ANGELES Sanchez     The use of a video visit has been reviewed with the patient and verbal informed consent has been obtained. Myself and Boston Fenton participated in this visit. The patient is located at [address] [place] [address]. I am located in [place], [place]. [employer] and [employer] were utilized.        This visit was performed via Telehealth.  This patient has been instructed to follow-up with their primary care provider if their symptoms worsen or the treatment provided does not resolve their illness.

## 2023-06-08 NOTE — PATIENT INSTRUCTIONS
Symptoms should begin to resolve now that you are at lower altitudes.  Tylenol as needed for headache.    If symptoms worsen or do not improve follow up with your PCP or visit your nearest Urgent Care Center or ER.

## 2023-09-18 ENCOUNTER — LAB (OUTPATIENT)
Dept: LAB | Facility: HOSPITAL | Age: 23
End: 2023-09-18
Payer: MEDICAID

## 2023-09-18 ENCOUNTER — OFFICE VISIT (OUTPATIENT)
Dept: OBSTETRICS AND GYNECOLOGY | Facility: CLINIC | Age: 23
End: 2023-09-18
Payer: MEDICAID

## 2023-09-18 VITALS
BODY MASS INDEX: 19.89 KG/M2 | WEIGHT: 123.8 LBS | DIASTOLIC BLOOD PRESSURE: 72 MMHG | HEIGHT: 66 IN | SYSTOLIC BLOOD PRESSURE: 108 MMHG

## 2023-09-18 DIAGNOSIS — Z01.411 ENCOUNTER FOR GYNECOLOGICAL EXAMINATION WITH ABNORMAL FINDING: Primary | ICD-10-CM

## 2023-09-18 DIAGNOSIS — N93.9 ABNORMAL UTERINE BLEEDING (AUB): ICD-10-CM

## 2023-09-18 DIAGNOSIS — R87.612 LGSIL ON PAP SMEAR OF CERVIX: ICD-10-CM

## 2023-09-18 LAB
BASOPHILS # BLD AUTO: 0.02 10*3/MM3 (ref 0–0.2)
BASOPHILS NFR BLD AUTO: 0.3 % (ref 0–1.5)
DEPRECATED RDW RBC AUTO: 43.4 FL (ref 37–54)
EOSINOPHIL # BLD AUTO: 0.13 10*3/MM3 (ref 0–0.4)
EOSINOPHIL NFR BLD AUTO: 1.9 % (ref 0.3–6.2)
ERYTHROCYTE [DISTWIDTH] IN BLOOD BY AUTOMATED COUNT: 13.5 % (ref 12.3–15.4)
HCT VFR BLD AUTO: 41 % (ref 34–46.6)
HGB BLD-MCNC: 13.6 G/DL (ref 12–15.9)
IMM GRANULOCYTES # BLD AUTO: 0.02 10*3/MM3 (ref 0–0.05)
IMM GRANULOCYTES NFR BLD AUTO: 0.3 % (ref 0–0.5)
LYMPHOCYTES # BLD AUTO: 2.57 10*3/MM3 (ref 0.7–3.1)
LYMPHOCYTES NFR BLD AUTO: 37.1 % (ref 19.6–45.3)
MCH RBC QN AUTO: 29.4 PG (ref 26.6–33)
MCHC RBC AUTO-ENTMCNC: 33.2 G/DL (ref 31.5–35.7)
MCV RBC AUTO: 88.6 FL (ref 79–97)
MONOCYTES # BLD AUTO: 0.48 10*3/MM3 (ref 0.1–0.9)
MONOCYTES NFR BLD AUTO: 6.9 % (ref 5–12)
NEUTROPHILS NFR BLD AUTO: 3.7 10*3/MM3 (ref 1.7–7)
NEUTROPHILS NFR BLD AUTO: 53.5 % (ref 42.7–76)
NRBC BLD AUTO-RTO: 0 /100 WBC (ref 0–0.2)
PLATELET # BLD AUTO: 188 10*3/MM3 (ref 140–450)
PMV BLD AUTO: 12.2 FL (ref 6–12)
RBC # BLD AUTO: 4.63 10*6/MM3 (ref 3.77–5.28)
WBC NRBC COR # BLD: 6.92 10*3/MM3 (ref 3.4–10.8)

## 2023-09-18 PROCEDURE — 84443 ASSAY THYROID STIM HORMONE: CPT

## 2023-09-18 PROCEDURE — 85025 COMPLETE CBC W/AUTO DIFF WBC: CPT

## 2023-09-18 NOTE — PROGRESS NOTES
"Chief Complaint  Boston Fenton is a 23 y.o.  female presenting for Annual Exam (Repeat pap.  ) and Vaginal Bleeding (Patient states that she recently became sexually active after not having sex X 10 months.  Started having spotting after intercourse the first time, has had spotting daily since that time.  Bloody discharge.  Partner has worn condom with each act of intercourse.)    History of Present Illness  Boston is a very pleasant 23-year-old nulligravida woman, here for annual exam.  She has a past medical history of an abnormal Pap smear at 21 years old (LSIL).    She had not been sexually active for a long time then had some postcoital bleeding, which has continued almost every day for weeks.  She denies any unprotected sexual activity.  No dyspareunia.  No pelvic or abdominal pain.    The egg retrieval that was documented in the last visit, did result in a healthy pregnancy.  That baby will be due in just a few months.  (Her family friends.)    Otherwise, ROS neg.  Doing great in law school.    The following portions of the patient's history were reviewed and updated as appropriate: allergies, current medications, past family history, past medical history, past social history, past surgical history, and problem list.    Allergies   Allergen Reactions    Mucinex [Guaifenesin Er] Swelling         Current Outpatient Medications:     benzoyl peroxide 2.5 % gel, , Disp: , Rfl:     clindamycin (CLINDAGEL) 1 % gel, , Disp: , Rfl:     Clindamycin Phos-Benzoyl Perox 1.2-2.5 % gel, , Disp: , Rfl:     Retin-A 0.1 % cream, , Disp: , Rfl:     spironolactone (ALDACTONE) 25 MG tablet, , Disp: , Rfl:     Past Medical History:   Diagnosis Date    Abnormal Pap smear of cervix     Anxiety         Past Surgical History:   Procedure Laterality Date    FERTILITY SURGERY      egg retrieval    TONSILLECTOMY         Objective  /72   Ht 167.6 cm (66\")   Wt 56.2 kg (123 lb 12.8 oz)   LMP 2023 (Exact Date)   " Breastfeeding No   BMI 19.98 kg/m²     Physical Exam  Vitals and nursing note reviewed. Exam conducted with a chaperone present.   Constitutional:       General: She is not in acute distress.     Appearance: Normal appearance. She is not ill-appearing.   HENT:      Head: Normocephalic.   Neck:      Thyroid: No thyroid mass or thyromegaly.   Cardiovascular:      Rate and Rhythm: Normal rate and regular rhythm.      Heart sounds: Normal heart sounds. No murmur heard.  Pulmonary:      Effort: Pulmonary effort is normal. No respiratory distress.      Breath sounds: Normal breath sounds.   Chest:   Breasts:     Right: No inverted nipple, mass or nipple discharge.      Left: No inverted nipple, mass or nipple discharge.   Abdominal:      Palpations: Abdomen is soft. There is no mass.      Tenderness: There is no abdominal tenderness.   Genitourinary:     General: Normal vulva.      Labia:         Right: No rash, tenderness or lesion.         Left: No rash, tenderness or lesion.       Vagina: Normal. No vaginal discharge or erythema.      Cervix: Cervical bleeding present. No discharge, lesion or erythema.      Uterus: Not enlarged and not tender.       Adnexa:         Right: No mass or tenderness.          Left: No mass or tenderness.        Comments: No external lesions or lacerations.  Vaginal mucosa is pink / intact;  2+ red blood in vag vault.  No leukoplakia or atypical vessels on cx; no polyps.  No abnormal discharge from os; no CMT.  Normal bimanual exam.  Anus appears wnl.  No rectal exam performed.  Lymphadenopathy:      Upper Body:      Right upper body: No supraclavicular or axillary adenopathy.      Left upper body: No supraclavicular or axillary adenopathy.   Skin:     General: Skin is warm and dry.   Neurological:      Mental Status: She is alert and oriented to person, place, and time.   Psychiatric:         Mood and Affect: Mood normal.         Behavior: Behavior normal.       Assessment/Plan   Diagnoses  and all orders for this visit:    1. Encounter for gynecological examination with abnormal finding (Primary)    2. LGSIL on Pap smear of cervix  -     LIQUID-BASED PAP SMEAR WITH HPV GENOTYPING REGARDLESS OF INTERPRETATION ([facility]YE,[facility])    3. Abnormal uterine bleeding (AUB)  -     TSH; Future  -     CBC & Differential; Future  -     US Non-ob Transvaginal; Future    Declines hormonal methods of contraception.  States they have affected her moods too much, in years past.  Will continue condoms by choice.    Procedures    19 to 39: Counseling/Anticipatory Guidance Discussed: family planning/contraception, sexual behavior and STDs, and breast cancer and self breast exams    Return in about 3 months (around 12/18/2023) for Recheck.    Tressa Johnson, ANGELES  09/18/2023

## 2023-09-19 LAB — TSH SERPL DL<=0.05 MIU/L-ACNC: 1.71 UIU/ML (ref 0.27–4.2)

## 2023-09-21 LAB — REF LAB TEST METHOD: NORMAL

## 2023-09-22 ENCOUNTER — TELEPHONE (OUTPATIENT)
Dept: OBSTETRICS AND GYNECOLOGY | Facility: CLINIC | Age: 23
End: 2023-09-22
Payer: MEDICAID

## 2023-09-22 NOTE — TELEPHONE ENCOUNTER
Pt is calling and would like to speak with someone about her test result - Pap Smear.    Please advise    Thank you!

## 2023-10-18 ENCOUNTER — OFFICE VISIT (OUTPATIENT)
Dept: FAMILY MEDICINE CLINIC | Facility: CLINIC | Age: 23
End: 2023-10-18
Payer: MEDICAID

## 2023-10-18 VITALS
OXYGEN SATURATION: 99 % | HEIGHT: 66 IN | HEART RATE: 77 BPM | TEMPERATURE: 97.5 F | BODY MASS INDEX: 19.51 KG/M2 | SYSTOLIC BLOOD PRESSURE: 111 MMHG | DIASTOLIC BLOOD PRESSURE: 74 MMHG | WEIGHT: 121.4 LBS

## 2023-10-18 DIAGNOSIS — Z00.00 ANNUAL PHYSICAL EXAM: Primary | ICD-10-CM

## 2023-10-18 DIAGNOSIS — Z23 NEED FOR TDAP VACCINATION: ICD-10-CM

## 2023-10-18 RX ORDER — ISOTRETINOIN 30 MG/1
CAPSULE ORAL
COMMUNITY
Start: 2023-09-27

## 2023-10-18 NOTE — PROGRESS NOTES
Follow Up Office Note     Patient Name: Boston Fenton  : 2000   MRN: [id number]     Chief Complaint:    Chief Complaint   Patient presents with    Annual Exam       History of Present Illness: Boston Fenton is a 23 y.o. female who presents today for annual physical exam.  Patient describes her overall health as good.  She has been having some menstrual irregularity for which she is following with gynecology.    Patient is fasting and amenable to labs today.    Subjective      I have reviewed and the following portions of the patient's history were updated as appropriate: past family history, past medical history, past social history, past surgical history and problem list.    Review of Systems:   Review of Systems   Constitutional: Negative.    HENT: Negative.          Dental exam up-to-date.   Eyes: Negative.         Eye exam up-to-date.   Respiratory: Negative.     Cardiovascular:  Negative for chest pain, palpitations and leg swelling.   Gastrointestinal: Negative.    Genitourinary:  Positive for menstrual problem. Negative for dysuria.   Musculoskeletal: Negative.    Neurological: Negative.    Psychiatric/Behavioral: Negative.          Past Medical History:   Past Medical History:   Diagnosis Date    Abnormal Pap smear of cervix     Anxiety      Patient's last menstrual period was 2023 (exact date).     Medications:     Current Outpatient Medications:     ISOtretinoin (ACCUTANE) 30 MG capsule, , Disp: , Rfl:     Retin-A 0.1 % cream, , Disp: , Rfl:     spironolactone (ALDACTONE) 25 MG tablet, , Disp: , Rfl:     Allergies:   Allergies   Allergen Reactions    Mucinex [Guaifenesin Er] Swelling         10/18/2023     8:13 AM   PHQ-2/PHQ-9 Depression Screening   Little Interest or Pleasure in Doing Things 0-->not at all   Feeling Down, Depressed or Hopeless 0-->not at all   PHQ-9: Brief Depression Severity Measure Score 0      ZULY-7  Feeling nervous, anxious or on edge: Not at all  Not being  "able to stop or control worrying: Not at all  Worrying too much about different things: Not at all  Trouble Relaxing: Not at all  Being so restless that it is hard to sit still: Not at all  Feeling afraid as if something awful might happen: Not at all  Becoming easily annoyed or irritable: Not at all  ZULY 7 Total Score: 0  If you checked any problems, how difficult have these problems made it for you to do your work, take care of things at home, or get along with other people: Not difficult at all         Objective     Physical Exam:  Vital Signs:   Vitals:    10/18/23 0813   BP: 111/74   Pulse: 77   Temp: 97.5 °F (36.4 °C)   TempSrc: Infrared   SpO2: 99%   Weight: 55.1 kg (121 lb 6.4 oz)   Height: 167.6 cm (65.98\")   PainSc: 0-No pain     Body mass index is 19.6 kg/m².     Physical Exam  Vitals and nursing note reviewed. Exam conducted with a chaperone present.   Constitutional:       General: She is not in acute distress.     Appearance: Normal appearance. She is well-developed and well-groomed. She is not ill-appearing, toxic-appearing or diaphoretic.   HENT:      Head: Normocephalic and atraumatic.      Right Ear: Tympanic membrane, ear canal and external ear normal.      Left Ear: Tympanic membrane, ear canal and external ear normal.      Nose: Nose normal.      Mouth/Throat:      Lips: Pink.      Mouth: Mucous membranes are moist.      Pharynx: Oropharynx is clear. Uvula midline. No posterior oropharyngeal erythema.   Neck:      Thyroid: No thyroid mass, thyromegaly or thyroid tenderness.   Cardiovascular:      Rate and Rhythm: Normal rate and regular rhythm.      Pulses: Normal pulses.      Heart sounds: Normal heart sounds, S1 normal and S2 normal. No murmur heard.  Pulmonary:      Effort: Pulmonary effort is normal. No respiratory distress.      Breath sounds: Normal breath sounds.   Abdominal:      General: Bowel sounds are normal. There is no distension.      Palpations: Abdomen is soft.      Tenderness: " There is no abdominal tenderness.   Musculoskeletal:         General: Normal range of motion.      Cervical back: Normal range of motion and neck supple.      Right lower leg: No edema.      Left lower leg: No edema.   Lymphadenopathy:      Cervical: No cervical adenopathy.   Skin:     General: Skin is warm and dry.      Capillary Refill: Capillary refill takes less than 2 seconds.      Findings: No rash.   Neurological:      General: No focal deficit present.      Mental Status: She is alert and oriented to person, place, and time.   Psychiatric:         Attention and Perception: Attention and perception normal.         Mood and Affect: Mood and affect normal.         Speech: Speech normal.         Behavior: Behavior normal. Behavior is cooperative.         Thought Content: Thought content normal.         Cognition and Memory: Cognition and memory normal.         Judgment: Judgment normal.         Assessment / Plan      Assessment/Plan:   Diagnoses and all orders for this visit:    1. Annual physical exam (Primary)  -     Comprehensive Metabolic Panel; Future  -     CBC (No Diff); Future  -     Lipid Panel; Future    2. Need for Tdap vaccination  -     Tdap Vaccine Greater Than or Equal To 6yo IM         The patient is here for a health maintenance visit.  Currently, the patient consumes a healthy diet and has an adequate exercise regimen.  Patient runs and does Pilates for exercise.  Screening lab work is ordered.  Immunization administered today and vaccine education is provided.  Advice and education is given regarding nutrition, aerobic exercise, routine dental evaluations, routine eye exams, reproductive health, cardiovascular risk reduction, sunscreen use, self skin examination (annual dermatology evaluations) and seat belt use (general overall safety).  Further recommendations after lab evaluation.  Annual wellness evaluations recommended.     Follow Up:   PRN and at next scheduled appointment(s) with  PCP.    Discussed the nature of the medical condition(s) risks, complications, implications, management, safe and proper use of medications. Encouraged medication compliance, and keeping scheduled follow up appointments with me and any other providers.      RTC if symptoms fail to improve, to ER if symptoms worsen.        *Dictated Utilizing Dragon Dictation   Please note that portions of this note were completed with a voice recognition program.   Part of this note may be an electronic transcription/translation of spoken language to printed text using the Dragon Dictation System. Spelling and/or grammatical errors may exist despite efforts at proofreading.      NOTE TO PATIENT: The 21st Century Cures Act makes medical notes like these available to patients in the interest of transparency. However, be advised this is a medical document. It is intended as peer to peer communication. It is written in medical language and may contain abbreviations or verbiage that are unfamiliar. It may appear blunt or direct. Medical documents are intended to carry relevant information, facts as evident, and the clinical opinion of the practitioner.      ANGELES Duran  [facility] [place]

## 2023-10-18 NOTE — LETTER
[facility]  Vaccine Consent Form    Patient Name:  Boston Fenton  Patient :  2000     Vaccine(s) Ordered    Tdap Vaccine Greater Than or Equal To 8yo IM        Screening Checklist  The following questions should be completed prior to vaccination. If you answer “yes” to any question, it does not necessarily mean you should not be vaccinated. It just means we may need to clarify or ask more questions. If a question is unclear, please ask your healthcare provider to explain it.    Yes No   Any fever or moderate to severe illness today (mild illness and/or antibiotic treatment are not contraindications)?     Do you have a history of a serious reaction to any previous vaccinations, such as anaphylaxis, encephalopathy within 7 days, Guillain-[place] syndrome within 6 weeks, seizure?     Have you received any live vaccine(s) in the past month (MMR, DUANE)?     Do you have an anaphylactic allergy to latex (DTaP, DTaP-IPV, Hep A, Hep B, MenB, RV, Td, Tdap), baker’s yeast (Hep B, HPV), or gelatin (DUANE, MMR)?     Do you have an anaphylactic allergy to neomycin (Rabies, DUANE, MMR, IPV, Hep A), polymyxin B (IPV), or streptomycin (IPV)?      Any cancer, leukemia, AIDS, or other immune system disorder? (DUANE, MMR, RV)     Do you have a parent, brother, or sister with an immune system problem (if immune competence of vaccine recipient clinically verified, can proceed)? (MMR, DUANE)     Any recent steroid treatments for >2 weeks, chemotherapy, or radiation treatment? (DUANE, MMR)     Have you received antibody-containing blood transfusions or IVIG in the past 11 months (recommended interval is dependent on product)? (MMR, DUANE)     Have you taken antiviral drugs (acyclovir, famciclovir, valacyclovir) in the last 24 or 48 hours, respectively (DUANE)?      Are you pregnant or planning to become pregnant within 1 month? (DUANE, MMR, HPV, IPV, MenB; For hep B- refer to Engerix-B)     For infants, have you ever been told your child has  had intussusception or a medical emergency involving obstruction of the intestine (RV)? If not for an infant, can skip this question.         *Ordering Physician/APC should be consulted if “yes” is checked by the patient or guardian above.      I have received, read, and understand the Vaccine Information Statement (VIS) for each vaccine ordered above.  I have considered my health status as well as the health status of my close contacts.  I have taken the opportunity to discuss my vaccine questions with my health care provider.   I have requested that the ordered vaccine(s) be given to me.  I understand the benefits and risks of the vaccines.  I understand that I should remain in the clinic for 15 minutes after receiving the vaccine(s).  _________________________________________________________  Signature of Patient or Parent/Legal Guardian ____________________  Date

## 2023-12-04 ENCOUNTER — TELEPHONE (OUTPATIENT)
Dept: OBSTETRICS AND GYNECOLOGY | Facility: CLINIC | Age: 23
End: 2023-12-04

## 2023-12-04 NOTE — TELEPHONE ENCOUNTER
I have attempted to contact the patient by telephone in regards to her appointment for colposcopy that she canceled today.    There was no answer, but I did leave a message on voicemail asking Boston to return my call tomorrow.

## 2024-01-05 ENCOUNTER — TELEMEDICINE (OUTPATIENT)
Dept: FAMILY MEDICINE CLINIC | Facility: TELEHEALTH | Age: 24
End: 2024-01-05
Payer: MEDICAID

## 2024-01-05 DIAGNOSIS — J01.10 ACUTE FRONTAL SINUSITIS, RECURRENCE NOT SPECIFIED: Primary | ICD-10-CM

## 2024-01-05 RX ORDER — CHLORCYCLIZINE HYDROCHLORIDE AND PSEUDOEPHEDRINE HYDROCHLORIDE 25; 60 MG/1; MG/1
1 TABLET ORAL 3 TIMES DAILY PRN
Qty: 21 TABLET | Refills: 0 | Status: SHIPPED | OUTPATIENT
Start: 2024-01-05

## 2024-01-05 RX ORDER — AMOXICILLIN AND CLAVULANATE POTASSIUM 875; 125 MG/1; MG/1
1 TABLET, FILM COATED ORAL 2 TIMES DAILY
Qty: 20 TABLET | Refills: 0 | Status: SHIPPED | OUTPATIENT
Start: 2024-01-05 | End: 2024-01-15

## 2024-01-05 NOTE — PATIENT INSTRUCTIONS
-Take all meds as prescribed even if you start feeling better  -May use tylenol/motrin or warm moist compress on the face for pain.   -May use saline nasal spray, netipot or flonase as desired  -If symptoms worsen or do not improve in 1 week follow up with urgent care or your primary care provider

## 2024-01-05 NOTE — PROGRESS NOTES
Subjective   Boston Fenton is a 23 y.o. female.     History of Present Illness  She started feeling sick a week ago with sore throat, runny nose. She was taking theraflu and alkeseltzer and started feeling fine. However 2 days she started feeling much worse with a lot of head pressure, pain when she leans forward, profuse runny nose. She has a history of sinus infections, however she hasn't been on antibiotics for awhile. She has been using netipot. She took a home covid test on Sunday which was negative.       The following portions of the patient's history were reviewed and updated as appropriate: allergies, current medications, past family history, past medical history, past social history, past surgical history, and problem list.    Review of Systems   Constitutional:  Negative for fever.   HENT:  Positive for congestion, rhinorrhea and sinus pressure.    Respiratory:  Positive for cough.        Objective   Physical Exam  Constitutional:       General: She is not in acute distress.     Appearance: She is well-developed. She is not diaphoretic.   HENT:      Nose:      Right Sinus: Frontal sinus tenderness present. No maxillary sinus tenderness.      Left Sinus: Frontal sinus tenderness present. No maxillary sinus tenderness.      Comments: Feels pain behind her eyes- patient guided exam  Pulmonary:      Effort: Pulmonary effort is normal.   Neurological:      Mental Status: She is alert and oriented to person, place, and time.   Psychiatric:         Behavior: Behavior normal.           Assessment & Plan   Diagnoses and all orders for this visit:    1. Acute frontal sinusitis, recurrence not specified (Primary)  -     amoxicillin-clavulanate (AUGMENTIN) 875-125 MG per tablet; Take 1 tablet by mouth 2 (Two) Times a Day for 10 days.  Dispense: 20 tablet; Refill: 0  -     Chlorcyclizine-Pseudoephed (Stahist AD) 25-60 MG tablet; Take 1 tablet by mouth 3 (Three) Times a Day As Needed (congestion).  Dispense: 21  tablet; Refill: 0                 The use of a video visit has been reviewed with the patient and verbal informed consent has been obtained. Myself and Boston Fenton participated in this visit. The patient is located in [place], [place] at home. I am located in [place], [place]. [employer] and [employer] were utilized. I spent 20 minutes in the patient's chart for this visit.

## 2024-04-20 ENCOUNTER — TELEMEDICINE (OUTPATIENT)
Dept: FAMILY MEDICINE CLINIC | Facility: TELEHEALTH | Age: 24
End: 2024-04-20
Payer: MEDICAID

## 2024-04-20 VITALS — TEMPERATURE: 97.9 F

## 2024-04-20 DIAGNOSIS — H60.11 CELLULITIS OF RIGHT EAR: Primary | ICD-10-CM

## 2024-04-20 PROCEDURE — 1159F MED LIST DOCD IN RCRD: CPT | Performed by: NURSE PRACTITIONER

## 2024-04-20 PROCEDURE — 99213 OFFICE O/P EST LOW 20 MIN: CPT | Performed by: NURSE PRACTITIONER

## 2024-04-20 PROCEDURE — 1160F RVW MEDS BY RX/DR IN RCRD: CPT | Performed by: NURSE PRACTITIONER

## 2024-04-20 RX ORDER — SULFAMETHOXAZOLE AND TRIMETHOPRIM 800; 160 MG/1; MG/1
1 TABLET ORAL 2 TIMES DAILY
Qty: 14 TABLET | Refills: 0 | Status: SHIPPED | OUTPATIENT
Start: 2024-04-20 | End: 2024-04-27

## 2024-04-20 NOTE — PATIENT INSTRUCTIONS
Piercings may need to be removed if the infection does not improve in 24-48 hours.  Only clean piercing sites with warm soapy water and thoroughly dry before applying neosporin ointment twice each day or if there is drainage. Turn earrings before cleaning.   The infection should improve in 24-48 hours. However, even with improvement or resolution, take all of antibiotic.   If symptoms do not improve in 48 hours or if they worsen at anytime, follow up at urgent care.        Cellulitis, Adult  Cellulitis is a skin infection. The infected area is often warm, red, swollen, and sore. It occurs most often on the legs, feet, and toes, but can happen on any part of the body.  This condition can be life-threatening without treatment. It is very important to get treated right away.  What are the causes?  This condition is caused by bacteria. The bacteria enter through a break in the skin, such as:  A cut.  A burn.  A bug bite.  An animal bite.  An open sore.  A crack.  What increases the risk?  Having a weak body's defense system (immune system).  Being older than 60 years old.  Having a blood sugar problem (diabetes).  Having a long-term liver disease (cirrhosis) or kidney disease.  Being very overweight (obese).  Having a skin problem, such as:  An itchy rash.  A rash caused by a fungus.  A rash with blisters.  Slow movement of blood in the veins (venous stasis).  Fluid buildup below the skin (edema).  This condition is more likely to occur in people who:  Have open cuts, burns, bites, or scrapes on the skin.  Have been treated with high-energy rays (radiation).  Use IV drugs.  What are the signs or symptoms?  Skin that:  Looks red or purple, or slightly darker than your usual skin color.  Has streaks.  Has spots.  Is swollen.  Is sore or painful when you touch it.  Is warm.  A fever.  Chills.  Blisters.  Tiredness (fatigue).  How is this treated?  Medicines to treat infections or allergies.  Rest.  Placing cold or warm  cloths on the skin.  Staying in the hospital, if the condition is very bad. You may need medicines through an IV.  Follow these instructions at home:  Medicines  Take over-the-counter and prescription medicines only as told by your doctor.  If you were prescribed antibiotics, take them as told by your doctor. Do not stop using them even if you start to feel better.  General instructions  Drink enough fluid to keep your pee (urine) pale yellow.  Do not touch or rub the infected area.  Raise (elevate) the infected area above the level of your heart while you are sitting or lying down.  Return to your normal activities when your doctor says that it is safe.  Place cold or warm cloths on the area as told by your doctor.  Keep all follow-up visits. Your doctor will need to make sure that a more serious infection is not developing.  Contact a doctor if:  You have a fever.  You do not start to get better after 1-2 days of treatment.  Your bone or joint under the infected area starts to hurt after the skin has healed.  Your infection comes back in the same area or another area. Signs of this may include:  You have a swollen bump in the area.  Your red area gets larger, turns dark in color, or hurts more.  You have more fluid coming from the wound.  Pus or a bad smell develops in your infected area.  You have more pain.  You feel sick and have muscle aches and weakness.  You develop vomiting or watery poop that will not go away.  Get help right away if:  You see red streaks coming from the area.  You notice the skin turns purple or black and falls off.  These symptoms may be an emergency. Get help right away. Call 911.  Do not wait to see if the symptoms will go away.  Do not drive yourself to the hospital.  This information is not intended to replace advice given to you by your health care provider. Make sure you discuss any questions you have with your health care provider.  Document Revised: 08/15/2023 Document Reviewed:  08/15/2023  Elsevier Patient Education © 2024 [employer]

## 2024-04-20 NOTE — PROGRESS NOTES
CHIEF COMPLAINT  No chief complaint on file.        HPI  Boston Fenton is a 23 y.o. female  presents with complaint of right ear pain and swelling. She got her ears pierced a week ago and for a couple of days her ear has gotten more sore and swelling. She is using neosporin antibiotic ointment on the piercing. She has two one one on both side of he helix.   She reports no fever or chills. She did take her temperature while on visit and is afebrile.     Review of Systems   Constitutional:  Negative for chills, diaphoresis, fatigue and fever.   Skin:         Red and swollen right ear.        Past Medical History:   Diagnosis Date   • Abnormal Pap smear of cervix    • Anxiety        Family History   Problem Relation Age of Onset   • Hypertension Father    • No Known Problems Mother    • No Known Problems Paternal Grandfather    • No Known Problems Paternal Grandmother    • Hyperthyroidism Maternal Grandmother    • Stomach cancer Maternal Grandfather    • Breast cancer Paternal Great-Grandmother        Social History     Socioeconomic History   • Marital status: Single   Tobacco Use   • Smoking status: Never   • Smokeless tobacco: Never   Vaping Use   • Vaping status: Never Used   Substance and Sexual Activity   • Alcohol use: Yes     Alcohol/week: 2.0 standard drinks of alcohol     Types: 2 Glasses of wine per week     Comment: socially   • Drug use: Never   • Sexual activity: Not Currently     Partners: Male     Birth control/protection: None           Temp 97.9 °F (36.6 °C) (Oral)     PHYSICAL EXAM  Physical Exam   Constitutional: She is oriented to person, place, and time. She appears well-developed and well-nourished. She does not have a sickly appearance. She does not appear ill. No distress.   HENT:   Head: Normocephalic and atraumatic.   Right Ear: There is tenderness. No drainage. Right ear swelling: erythematous.No mastoid tenderness. No decreased hearing is noted. no right ear laceration(s) noted.  Left  Ear: No drainage, swelling or tenderness. No mastoid tenderness. No decreased hearing is noted. no left ear laceration(s) noted.  Ears:    Black-piercing  Red-cellulitis    Eyes: EOM are normal.   Neck: Neck normal appearance.  Pulmonary/Chest: Effort normal.  No respiratory distress.  Lymphadenopathy:        Head (right side): Preauricular adenopathy present.     She has no cervical adenopathy.   Neurological: She is alert and oriented to person, place, and time.   Skin: Skin is dry.   Psychiatric: She has a normal mood and affect.           Diagnoses and all orders for this visit:    1. Cellulitis of right ear (Primary)    Other orders  -     sulfamethoxazole-trimethoprim (Bactrim DS) 800-160 MG per tablet; Take 1 tablet by mouth 2 (Two) Times a Day for 7 days.  Dispense: 14 tablet; Refill: 0    She is not wanting to remove piercings, but I have advised her that this may need to be done if the infection does not improve in 24-48 hours.     The use of a video visit has been reviewed with the patient and verbal informed consent has been obtained. Myself and Boston Fenton participated in this visit. The patient is located in [address] [place] [address]. I am located in [place], [place]. [employer] and [employer] were utilized.       Note Disclaimer: At [facility], we believe that sharing information builds trust and better   relationships. You are receiving this note because you recently visited [facility]. It is possible you   will see health information before a provider has talked with you about it. This kind of information can   be easy to misunderstand. To help you fully understand what it means for your health, we urge you to   discuss this note with your provider.    Jazmin Rosa, ANGELES  04/20/2024  09:03 EDT

## 2024-06-07 ENCOUNTER — APPOINTMENT (OUTPATIENT)
Dept: CT IMAGING | Facility: HOSPITAL | Age: 24
End: 2024-06-07
Payer: MEDICAID

## 2024-06-07 ENCOUNTER — HOSPITAL ENCOUNTER (EMERGENCY)
Facility: HOSPITAL | Age: 24
Discharge: HOME | End: 2024-06-07
Attending: EMERGENCY MEDICINE
Payer: MEDICAID

## 2024-06-07 VITALS
TEMPERATURE: 98.2 F | DIASTOLIC BLOOD PRESSURE: 70 MMHG | SYSTOLIC BLOOD PRESSURE: 102 MMHG | BODY MASS INDEX: 20.89 KG/M2 | RESPIRATION RATE: 18 BRPM | WEIGHT: 130 LBS | OXYGEN SATURATION: 98 % | HEIGHT: 66 IN | HEART RATE: 67 BPM

## 2024-06-07 DIAGNOSIS — H57.02 ANISOCORIA: Primary | ICD-10-CM

## 2024-06-07 DIAGNOSIS — R51.9 ACUTE NONINTRACTABLE HEADACHE, UNSPECIFIED HEADACHE TYPE: ICD-10-CM

## 2024-06-07 LAB
ANION GAP SERPL CALCULATED.3IONS-SCNC: 10 MMOL/L (ref 5–15)
B-HCG UR QL: NEGATIVE
BACTERIA UR QL AUTO: ABNORMAL /HPF
BASOPHILS # BLD AUTO: 0.02 10*3/MM3 (ref 0–0.2)
BASOPHILS NFR BLD AUTO: 0.3 % (ref 0–1.5)
BILIRUB UR QL STRIP: NEGATIVE
BUN SERPL-MCNC: 13 MG/DL (ref 6–20)
BUN/CREAT SERPL: 16 (ref 7–25)
CALCIUM SPEC-SCNC: 9.1 MG/DL (ref 8.6–10.5)
CHLORIDE SERPL-SCNC: 105 MMOL/L (ref 98–107)
CLARITY UR: CLEAR
CO2 SERPL-SCNC: 26 MMOL/L (ref 22–29)
COLOR UR: YELLOW
CREAT SERPL-MCNC: 0.81 MG/DL (ref 0.57–1)
DEPRECATED RDW RBC AUTO: 41.1 FL (ref 37–54)
EGFRCR SERPLBLD CKD-EPI 2021: 104.1 ML/MIN/1.73
EOSINOPHIL # BLD AUTO: 0.11 10*3/MM3 (ref 0–0.4)
EOSINOPHIL NFR BLD AUTO: 1.9 % (ref 0.3–6.2)
ERYTHROCYTE [DISTWIDTH] IN BLOOD BY AUTOMATED COUNT: 12.4 % (ref 12.3–15.4)
GLUCOSE SERPL-MCNC: 94 MG/DL (ref 65–99)
GLUCOSE UR STRIP-MCNC: NEGATIVE MG/DL
HCT VFR BLD AUTO: 42.9 % (ref 34–46.6)
HGB BLD-MCNC: 13.9 G/DL (ref 12–15.9)
HGB UR QL STRIP.AUTO: NEGATIVE
HYALINE CASTS UR QL AUTO: ABNORMAL /LPF
IMM GRANULOCYTES # BLD AUTO: 0.01 10*3/MM3 (ref 0–0.05)
IMM GRANULOCYTES NFR BLD AUTO: 0.2 % (ref 0–0.5)
KETONES UR QL STRIP: NEGATIVE
LEUKOCYTE ESTERASE UR QL STRIP.AUTO: ABNORMAL
LYMPHOCYTES # BLD AUTO: 3.09 10*3/MM3 (ref 0.7–3.1)
LYMPHOCYTES NFR BLD AUTO: 52.4 % (ref 19.6–45.3)
MCH RBC QN AUTO: 29.4 PG (ref 26.6–33)
MCHC RBC AUTO-ENTMCNC: 32.4 G/DL (ref 31.5–35.7)
MCV RBC AUTO: 90.9 FL (ref 79–97)
MONOCYTES # BLD AUTO: 0.45 10*3/MM3 (ref 0.1–0.9)
MONOCYTES NFR BLD AUTO: 7.6 % (ref 5–12)
NEUTROPHILS NFR BLD AUTO: 2.22 10*3/MM3 (ref 1.7–7)
NEUTROPHILS NFR BLD AUTO: 37.6 % (ref 42.7–76)
NITRITE UR QL STRIP: NEGATIVE
NRBC BLD AUTO-RTO: 0 /100 WBC (ref 0–0.2)
PH UR STRIP.AUTO: 7 [PH] (ref 5–8)
PLATELET # BLD AUTO: 151 10*3/MM3 (ref 140–450)
PMV BLD AUTO: 12 FL (ref 6–12)
POTASSIUM SERPL-SCNC: 3.6 MMOL/L (ref 3.5–5.2)
PROT UR QL STRIP: NEGATIVE
RBC # BLD AUTO: 4.72 10*6/MM3 (ref 3.77–5.28)
RBC # UR STRIP: ABNORMAL /HPF
REF LAB TEST METHOD: ABNORMAL
SODIUM SERPL-SCNC: 141 MMOL/L (ref 136–145)
SP GR UR STRIP: 1.01 (ref 1–1.03)
SQUAMOUS #/AREA URNS HPF: ABNORMAL /HPF
UROBILINOGEN UR QL STRIP: ABNORMAL
WBC # UR STRIP: ABNORMAL /HPF
WBC NRBC COR # BLD AUTO: 5.9 10*3/MM3 (ref 3.4–10.8)

## 2024-06-07 PROCEDURE — 99285 EMERGENCY DEPT VISIT HI MDM: CPT

## 2024-06-07 PROCEDURE — 25010000002 DEXAMETHASONE PER 1 MG: Performed by: EMERGENCY MEDICINE

## 2024-06-07 PROCEDURE — 70496 CT ANGIOGRAPHY HEAD: CPT

## 2024-06-07 PROCEDURE — 70450 CT HEAD/BRAIN W/O DYE: CPT

## 2024-06-07 PROCEDURE — 80048 BASIC METABOLIC PNL TOTAL CA: CPT | Performed by: EMERGENCY MEDICINE

## 2024-06-07 PROCEDURE — 96374 THER/PROPH/DIAG INJ IV PUSH: CPT

## 2024-06-07 PROCEDURE — 70498 CT ANGIOGRAPHY NECK: CPT

## 2024-06-07 PROCEDURE — 25010000002 PROCHLORPERAZINE 10 MG/2ML SOLUTION: Performed by: EMERGENCY MEDICINE

## 2024-06-07 PROCEDURE — 25010000002 KETOROLAC TROMETHAMINE PER 15 MG: Performed by: EMERGENCY MEDICINE

## 2024-06-07 PROCEDURE — 85025 COMPLETE CBC W/AUTO DIFF WBC: CPT | Performed by: EMERGENCY MEDICINE

## 2024-06-07 PROCEDURE — 25510000001 IOPAMIDOL PER 1 ML: Performed by: EMERGENCY MEDICINE

## 2024-06-07 PROCEDURE — 25010000002 DIPHENHYDRAMINE PER 50 MG: Performed by: EMERGENCY MEDICINE

## 2024-06-07 PROCEDURE — 81001 URINALYSIS AUTO W/SCOPE: CPT | Performed by: EMERGENCY MEDICINE

## 2024-06-07 PROCEDURE — 36415 COLL VENOUS BLD VENIPUNCTURE: CPT

## 2024-06-07 PROCEDURE — 81025 URINE PREGNANCY TEST: CPT | Performed by: EMERGENCY MEDICINE

## 2024-06-07 PROCEDURE — 25810000003 SODIUM CHLORIDE 0.9 % SOLUTION: Performed by: EMERGENCY MEDICINE

## 2024-06-07 PROCEDURE — 96375 TX/PRO/DX INJ NEW DRUG ADDON: CPT

## 2024-06-07 RX ORDER — KETOROLAC TROMETHAMINE 15 MG/ML
15 INJECTION, SOLUTION INTRAMUSCULAR; INTRAVENOUS ONCE
Status: COMPLETED | OUTPATIENT
Start: 2024-06-07 | End: 2024-06-07

## 2024-06-07 RX ORDER — DEXAMETHASONE SODIUM PHOSPHATE 10 MG/ML
6 INJECTION INTRAMUSCULAR; INTRAVENOUS ONCE
Status: COMPLETED | OUTPATIENT
Start: 2024-06-07 | End: 2024-06-07

## 2024-06-07 RX ORDER — DIPHENHYDRAMINE HYDROCHLORIDE 50 MG/ML
25 INJECTION INTRAMUSCULAR; INTRAVENOUS ONCE
Status: COMPLETED | OUTPATIENT
Start: 2024-06-07 | End: 2024-06-07

## 2024-06-07 RX ORDER — SODIUM CHLORIDE 0.9 % (FLUSH) 0.9 %
10 SYRINGE (ML) INJECTION AS NEEDED
Status: DISCONTINUED | OUTPATIENT
Start: 2024-06-07 | End: 2024-06-07 | Stop reason: HOSPADM

## 2024-06-07 RX ORDER — PROCHLORPERAZINE EDISYLATE 5 MG/ML
10 INJECTION INTRAMUSCULAR; INTRAVENOUS ONCE
Status: COMPLETED | OUTPATIENT
Start: 2024-06-07 | End: 2024-06-07

## 2024-06-07 RX ADMIN — DEXAMETHASONE SODIUM PHOSPHATE 6 MG: 10 INJECTION INTRAMUSCULAR; INTRAVENOUS at 12:07

## 2024-06-07 RX ADMIN — SODIUM CHLORIDE 1000 ML: 9 INJECTION, SOLUTION INTRAVENOUS at 11:59

## 2024-06-07 RX ADMIN — KETOROLAC TROMETHAMINE 15 MG: 15 INJECTION, SOLUTION INTRAMUSCULAR; INTRAVENOUS at 12:05

## 2024-06-07 RX ADMIN — IOPAMIDOL 80 ML: 755 INJECTION, SOLUTION INTRAVENOUS at 11:50

## 2024-06-07 RX ADMIN — DIPHENHYDRAMINE HYDROCHLORIDE 25 MG: 50 INJECTION INTRAMUSCULAR; INTRAVENOUS at 11:59

## 2024-06-07 RX ADMIN — PROCHLORPERAZINE EDISYLATE 10 MG: 5 INJECTION INTRAMUSCULAR; INTRAVENOUS at 12:09

## 2024-06-07 NOTE — DISCHARGE INSTRUCTIONS
Someone from the [facility] ophthal[facility] clinic will call you and arrange follow-up in the coming week.  Return here anytime if worsening headache or if you notice any other neurologic symptoms such as numbness or weakness in your arms or legs, difficulty walking, or any other concerns.  Also call your regular eye doctor and arrange follow-up.  It is important to have a primary care provider to follow-up with.  If you have 1 then call them to arrange follow-up if not then call the patient connection number.

## 2024-06-07 NOTE — ED PROVIDER NOTES
Subjective   History of Present Illness  Ms Fenton presents with dilated left pupil and headache.  She tells me she woke up this morning and looked in the mirror and discovered that her left pupil was dilated.  Afterwards she developed diffuse headache.  She denies light sensitivity.  She denies history of migraines.  She denies any recent rashes or illnesses.  She denies any chronic medical problems and takes no medications.  She denies focal numbness or weakness in any extremity.  She wears contact lenses but does not have them in today.      Review of Systems    Past Medical History:   Diagnosis Date    Abnormal Pap smear of cervix     Anxiety        Allergies   Allergen Reactions    Mucinex [Guaifenesin Er] Swelling       Past Surgical History:   Procedure Laterality Date    FERTILITY SURGERY      egg retrieval    TONSILLECTOMY         Family History   Problem Relation Age of Onset    Hypertension Father     No Known Problems Mother     No Known Problems Paternal Grandfather     No Known Problems Paternal Grandmother     Hyperthyroidism Maternal Grandmother     Stomach cancer Maternal Grandfather     Breast cancer Paternal Great-Grandmother        Social History     Socioeconomic History    Marital status: Single   Tobacco Use    Smoking status: Never    Smokeless tobacco: Never   Vaping Use    Vaping status: Never Used   Substance and Sexual Activity    Alcohol use: Yes     Alcohol/week: 2.0 standard drinks of alcohol     Types: 2 Glasses of wine per week     Comment: socially    Drug use: Never    Sexual activity: Not Currently     Partners: Male     Birth control/protection: None           Objective   Physical Exam  Vitals and nursing note reviewed.   Constitutional:       General: She is not in acute distress.     Appearance: Normal appearance.   HENT:      Head: Normocephalic and atraumatic.      Nose: Nose normal. No congestion or rhinorrhea.   Eyes:      General: No scleral icterus.     Extraocular  Movements: Extraocular movements intact.      Conjunctiva/sclera: Conjunctivae normal.      Comments: Left pupil is several millimeters larger than the right.  Both pupils react to direct and consensual light.   Neck:      Comments: No JVD   Cardiovascular:      Rate and Rhythm: Normal rate and regular rhythm.      Heart sounds: No murmur heard.     No friction rub.   Pulmonary:      Effort: Pulmonary effort is normal.      Breath sounds: Normal breath sounds. No wheezing or rales.   Musculoskeletal:         General: No tenderness.      Cervical back: Normal range of motion and neck supple.      Right lower leg: No edema.      Left lower leg: No edema.   Skin:     General: Skin is warm and dry.      Coloration: Skin is not pale.      Findings: No erythema.   Neurological:      General: No focal deficit present.      Mental Status: She is alert and oriented to person, place, and time.      Motor: No weakness.      Coordination: Coordination normal.      Comments: Normal gait   Psychiatric:         Mood and Affect: Mood normal.         Behavior: Behavior normal.         Thought Content: Thought content normal.         Procedures           ED Course  ED Course as of 06/07/24 1924 Fri Jun 07, 2024 1124 Will give migraine medications and obtain CT with CT angiogram. [DT]   1226 CT and CT angiograms normal.  On repeat exam the degree of anisocoria seems a little bit less but is still present.  She tells me her headache is unchanged.  Will discuss with ophthalmology on-call. [DT]   1339 Awaiting return call from ophthalmology.  On repeat exam anisocoria is still present but decreased.  She tells me her headache is gone. [DT]   1343 Called [facility], they are not sure what the delay is but asked that I give them more time. [DT]   1408 Spoke with Dr. Parson on-call for ophthalmology at  and he wants to see her in the clinic this coming week.  They will call her. [DT]      ED Course User Index  [DT] Jarad Doty MD                                              Medical Decision Making  Please see course notes.  I ordered and interpreted CT scan of her head as well as CT angiograms of head and neck.  Gave multiple IV medications.  Had several reevaluations.  Ordered and interpreted labs.  Had consultation with ophthalmology and made arrangements for close outpatient follow-up.    Problems Addressed:  Acute nonintractable headache, unspecified headache type: complicated acute illness or injury  Anisocoria: complicated acute illness or injury that poses a threat to life or bodily functions    Amount and/or Complexity of Data Reviewed  Labs: ordered. Decision-making details documented in ED Course.  Radiology: ordered. Decision-making details documented in ED Course.    Risk  Prescription drug management.        Final diagnoses:   Acute nonintractable headache, unspecified headache type   Anisocoria       ED Disposition  ED Disposition       ED Disposition   Discharge    Condition   Stable    Comment   --               PATIENT CONNECTION - [place] [id number] [phone]             Medication List      No changes were made to your prescriptions during this visit.            Jarad Doty MD  06/07/24 3164

## 2024-06-11 ENCOUNTER — OFFICE VISIT (OUTPATIENT)
Dept: FAMILY MEDICINE CLINIC | Facility: CLINIC | Age: 24
End: 2024-06-11
Payer: MEDICAID

## 2024-06-11 VITALS
BODY MASS INDEX: 19.93 KG/M2 | DIASTOLIC BLOOD PRESSURE: 80 MMHG | OXYGEN SATURATION: 99 % | HEIGHT: 66 IN | WEIGHT: 124 LBS | HEART RATE: 71 BPM | SYSTOLIC BLOOD PRESSURE: 116 MMHG

## 2024-06-11 DIAGNOSIS — G43.009 ATYPICAL MIGRAINE: Primary | ICD-10-CM

## 2024-06-11 DIAGNOSIS — H57.02 PUPIL ASYMMETRY: ICD-10-CM

## 2024-06-11 PROCEDURE — 1126F AMNT PAIN NOTED NONE PRSNT: CPT | Performed by: STUDENT IN AN ORGANIZED HEALTH CARE EDUCATION/TRAINING PROGRAM

## 2024-06-11 PROCEDURE — 99213 OFFICE O/P EST LOW 20 MIN: CPT | Performed by: STUDENT IN AN ORGANIZED HEALTH CARE EDUCATION/TRAINING PROGRAM

## 2024-06-11 NOTE — PROGRESS NOTES
New Patient Office Visit      Date: 2024   Patient Name: Boston Fenton  : 2000   MRN: [id number]     Chief Complaint:    Chief Complaint   Patient presents with    Establish Care     Follow up on ER visit       History of Present Illness: Boston Fenton is a 24 y.o. female who is here today to establish care.      Subjective      HPI:  Pt presented to  ER on 24 with c/o dilated left pupil and headache. Woke up that morning and noticed her eye along w new onset headaches. No h/o frequent headaches or migraines previously. Denies new medications, recent gardening, insect or tick bites. CT and CTA were normal. She was referred to Ophthalmology and discharged home.   Pupils have returned to equal size. She denies any vision changes. Headache has not returned either. She is still waiting to hear from Ophthalmology.   She does note that her periods have gotten heavier in the last few months. Has more cramping at the start as well which she manages w OTC NSAIDs.   No other concerns today.    Review of Systems:   Negative/not pertinent unless otherwise noted above in HPI.     Past Medical History:   Past Medical History:   Diagnosis Date    Abnormal Pap smear of cervix     Anxiety        Past Surgical History:   Past Surgical History:   Procedure Laterality Date    FERTILITY SURGERY      egg retrieval    TONSILLECTOMY         Family History:   Family History   Problem Relation Age of Onset    Hypertension Father     No Known Problems Mother     No Known Problems Paternal Grandfather     No Known Problems Paternal Grandmother     Hyperthyroidism Maternal Grandmother     Stomach cancer Maternal Grandfather     Breast cancer Paternal Great-Grandmother        Social History:   Social History     Socioeconomic History    Marital status: Single   Tobacco Use    Smoking status: Never    Smokeless tobacco: Never   Vaping Use    Vaping status: Never Used   Substance and Sexual Activity    Alcohol use:  "Yes     Alcohol/week: 1.0 standard drink of alcohol     Types: 1 Glasses of wine per week     Comment: socially    Drug use: Never    Sexual activity: Yes     Partners: Male     Birth control/protection: None       Medications:     Current Outpatient Medications:     Retin-A 0.1 % cream, , Disp: , Rfl:     Allergies:   Allergies   Allergen Reactions    Mucinex [Guaifenesin Er] Swelling       Immunizations:  Immunization History   Administered Date(s) Administered    Covid-19 (Pfizer) Gray Cap Monovalent 01/27/2022    Meningococcal Conjugate 02/28/2017    Tdap 10/18/2023       Tobacco Use: Low Risk  (6/11/2024)    Patient History     Smoking Tobacco Use: Never     Smokeless Tobacco Use: Never     Passive Exposure: Not on file       Social History     Substance and Sexual Activity   Alcohol Use Yes    Alcohol/week: 1.0 standard drink of alcohol    Types: 1 Glasses of wine per week    Comment: socially        Social History     Substance and Sexual Activity   Drug Use Never      Objective     Physical Exam:  Vital Signs:   Vitals:    06/11/24 0924   BP: 116/80   BP Location: Left arm   Patient Position: Sitting   Cuff Size: Adult   Pulse: 71   SpO2: 99%   Weight: 56.2 kg (124 lb)   Height: 167.6 cm (66\")     Body mass index is 20.01 kg/m².    Physical Exam  Constitutional:       Appearance: She is normal weight. She is not ill-appearing.   HENT:      Head: Normocephalic and atraumatic.   Eyes:      Extraocular Movements: Extraocular movements intact.      Conjunctiva/sclera: Conjunctivae normal.      Pupils: Pupils are equal, round, and reactive to light.   Cardiovascular:      Rate and Rhythm: Normal rate and regular rhythm.      Heart sounds: Normal heart sounds.   Pulmonary:      Breath sounds: Normal breath sounds. No wheezing or rhonchi.   Musculoskeletal:         General: Normal range of motion.      Cervical back: Normal range of motion and neck supple.   Lymphadenopathy:      Cervical: No cervical adenopathy. "   Neurological:      General: No focal deficit present.      Mental Status: She is alert.   Psychiatric:         Mood and Affect: Mood normal.         Thought Content: Thought content normal.       Labs:   Hemoglobin A1C   Date Value Ref Range Status   03/05/2021 5.32 4.80 - 5.60 % Final     TSH   Date Value Ref Range Status   09/18/2023 1.710 0.270 - 4.200 uIU/mL Final          Assessment / Plan      Assessment/Plan:   Diagnoses and all orders for this visit:    1. Atypical migraine (Primary)    2. Pupil asymmetry    Reviewed ER notes, imaging results. Pt's symptoms have now resolved. CT/CTA were unremarkable. I can't identify any alternative cause for the sudden onset pupil dilation. Discussed its possible she experienced an atypical migraine. She is no longer having any headache symptoms. Pt can FU w Ophthalmology as planned and call/RTC if her symptoms worsen.     Healthcare Maintenance:  Counseling provided based on age appropriate USPSTF guidelines.  BMI is within normal parameters. No other follow-up for BMI required.    Boston Fenton voices understanding and acceptance of this advice and will call back with any further questions or concerns. AVS with preventive healthcare tips printed for patient.         Follow Up:   No follow-ups on file.        Karen Davis DO  [facility] [place] [address]

## 2024-07-17 ENCOUNTER — OFFICE VISIT (OUTPATIENT)
Dept: OBSTETRICS AND GYNECOLOGY | Facility: CLINIC | Age: 24
End: 2024-07-17
Payer: MEDICAID

## 2024-07-17 VITALS
HEART RATE: 64 BPM | DIASTOLIC BLOOD PRESSURE: 70 MMHG | SYSTOLIC BLOOD PRESSURE: 100 MMHG | BODY MASS INDEX: 20.05 KG/M2 | WEIGHT: 124.2 LBS

## 2024-07-17 DIAGNOSIS — Z30.09 ENCOUNTER FOR COUNSELING REGARDING CONTRACEPTION: Primary | ICD-10-CM

## 2024-07-17 DIAGNOSIS — Z30.430 ENCOUNTER FOR IUD INSERTION: ICD-10-CM

## 2024-07-17 LAB
B-HCG UR QL: NEGATIVE
EXPIRATION DATE: NORMAL
INTERNAL NEGATIVE CONTROL: NEGATIVE
INTERNAL POSITIVE CONTROL: POSITIVE
Lab: NORMAL

## 2024-07-17 RX ORDER — COPPER 313.4 MG/1
1 INTRAUTERINE DEVICE INTRAUTERINE ONCE
Status: COMPLETED | OUTPATIENT
Start: 2024-07-17 | End: 2024-07-17

## 2024-07-17 RX ORDER — COPPER 313.4 MG/1
1 INTRAUTERINE DEVICE INTRAUTERINE
Start: 2024-07-17 | End: 2034-07-15

## 2024-07-17 RX ADMIN — COPPER 1 EACH: 313.4 INTRAUTERINE DEVICE INTRAUTERINE at 15:48

## 2024-07-17 NOTE — PROGRESS NOTES
Subjective   Chief Complaint   Patient presents with    Follow-up     Pt states that she wants the Paragard.     Boston Fenton is a 24 y.o. year old .  Patient's last menstrual period was 2024 (exact date).  She presents for contraception discussion. She states she is interested in the copper Paragard IUD. She has tried OCPs x2 in the past and had a bad experience. She denies any unprotected intercourse in the last 2 weeks. She would like to avoid hormonal birth control if at all possible.     The following portions of the patient's history were reviewed and updated as appropriate:current medications, allergies, and past medical history    Social History    Tobacco Use      Smoking status: Never      Smokeless tobacco: Never         Objective   /70 (BP Location: Left arm, Patient Position: Sitting, Cuff Size: Adult)   Pulse 64   Wt 56.3 kg (124 lb 3.2 oz)   LMP 2024 (Exact Date)   Breastfeeding No   BMI 20.05 kg/m²     General:  well developed; well nourished  no acute distress   Skin:  No suspicious lesions seen   Thyroid: not examined   Lungs:  breathing is unlabored   Heart:  Not performed.   Pelvis: Clinical staff was present for exam  External genitalia:  normal appearance of the external genitalia including Bartholin's and Lemoyne's glands.  :  urethral meatus normal;  Vaginal:  normal pink mucosa without prolapse or lesions. blood present -  small amount;  Cervix:  normal appearance. ectropian present;     Lab Review   Pap smear, due for repeat in September     Imaging   No data reviewed        Assessment   Contraception counseling   IUD insertion  Abnormal pap (ASCUS, +HR HPV non 16/18), due for repeat in September      Plan   After counseling, patient elects to proceed with ParaGard copper IUD.  See below for insertion details.  The importance of keeping all planned follow-up and taking all medications as prescribed was emphasized.  Follow up 4 weeks for TVUS and string  check or sooner PRN     No orders of the defined types were placed in this encounter.         This note was electronically signed.    Sary Barroso MD   July 17, 2024     IUD Insertion    Patient's last menstrual period was 07/11/2024 (exact date).    Date of procedure:  7/17/2024    Risks and benefits discussed? yes  All questions answered? yes  Consents given by The patient  Written consent obtained? yes    Local anesthesia used:  no    Procedure documentation:    After verifying the patient had a low probability of being pregnant and met the criteria for insertion, a sterile speculum has placed and the cervix was cleansed with an antiseptic solution.  Vaginal discharge was scant.  The anterior lip of the cervix was grasped with a tenaculum and the uterine cavity was gently sounded. There was no difficulty passing the sound through the cervix.  Cervical dilation did not need to be performed prior to placing the IUD.  The uterus was midposition and sounded to 7 cms.  The ParaGard was then prepared per the manufacturers instructions.    The Paraguard was advanced through the cervical canal until the upper edge of the flange was flush with the external cervix.  The insertion roselia was held in place while the insertion tube was withdrawn.  I waited 10-15 seconds for the arms of the IUS to fully open and the devise to seat in the cavity.  The roselia was removed first followed by the insertion tube. The string was cut 1 cms in length.  Bleeding from the cervix was scant.    She tolerated the procedure without any difficulty.     Post procedure instructions: It was reviewed that the Paraguard will not alter the timing of when she bleeds but it may increase the quantity of flow and cramps. Efficacy rate of > 98% over the 10 years was discussed.  Spontaneous expulsion rate of 1-2% was also discussed.  If she has any issue with fever or excessive bleeding or pain she is to call the office immediately.  Otherwise I would like to  see her back in 4 weeks with an ultrasound to confirm correct placement    This note was electronically signed.  Sary Barroso MD   July 17, 2024

## 2024-09-20 ENCOUNTER — OFFICE VISIT (OUTPATIENT)
Dept: OBSTETRICS AND GYNECOLOGY | Facility: CLINIC | Age: 24
End: 2024-09-20
Payer: MEDICAID

## 2024-09-20 VITALS
SYSTOLIC BLOOD PRESSURE: 100 MMHG | HEART RATE: 84 BPM | HEIGHT: 66 IN | BODY MASS INDEX: 19.22 KG/M2 | DIASTOLIC BLOOD PRESSURE: 60 MMHG | WEIGHT: 119.6 LBS

## 2024-09-20 DIAGNOSIS — Z01.419 WOMEN'S ANNUAL ROUTINE GYNECOLOGICAL EXAMINATION: Primary | ICD-10-CM

## 2024-09-20 DIAGNOSIS — Z13.9 SPECIAL SCREENING: ICD-10-CM

## 2024-09-20 DIAGNOSIS — Z87.42 HISTORY OF ABNORMAL CERVICAL PAP SMEAR: ICD-10-CM

## 2024-09-20 DIAGNOSIS — Z30.432 ENCOUNTER FOR IUD REMOVAL: ICD-10-CM

## 2024-09-20 DIAGNOSIS — Z30.431 IUD CHECK UP: ICD-10-CM

## 2024-09-24 ENCOUNTER — TELEMEDICINE (OUTPATIENT)
Dept: FAMILY MEDICINE CLINIC | Facility: TELEHEALTH | Age: 24
End: 2024-09-24
Payer: MEDICAID

## 2024-09-24 DIAGNOSIS — J30.9 ALLERGIC RHINITIS, UNSPECIFIED SEASONALITY, UNSPECIFIED TRIGGER: Primary | ICD-10-CM

## 2024-09-24 PROCEDURE — 99213 OFFICE O/P EST LOW 20 MIN: CPT | Performed by: NURSE PRACTITIONER

## 2024-09-24 PROCEDURE — 1159F MED LIST DOCD IN RCRD: CPT | Performed by: NURSE PRACTITIONER

## 2024-09-24 PROCEDURE — 1160F RVW MEDS BY RX/DR IN RCRD: CPT | Performed by: NURSE PRACTITIONER

## 2024-09-24 RX ORDER — PREDNISONE 10 MG/1
TABLET ORAL
Qty: 21 TABLET | Refills: 0 | Status: SHIPPED | OUTPATIENT
Start: 2024-09-24 | End: 2024-09-26 | Stop reason: HOSPADM

## 2024-09-26 ENCOUNTER — OFFICE VISIT (OUTPATIENT)
Dept: FAMILY MEDICINE CLINIC | Facility: CLINIC | Age: 24
End: 2024-09-26
Payer: MEDICAID

## 2024-09-26 VITALS
WEIGHT: 116.6 LBS | SYSTOLIC BLOOD PRESSURE: 106 MMHG | HEART RATE: 89 BPM | DIASTOLIC BLOOD PRESSURE: 70 MMHG | HEIGHT: 66 IN | BODY MASS INDEX: 18.74 KG/M2 | OXYGEN SATURATION: 97 %

## 2024-09-26 DIAGNOSIS — J30.81 CAT ALLERGIES: Primary | ICD-10-CM

## 2024-09-26 PROCEDURE — 99214 OFFICE O/P EST MOD 30 MIN: CPT | Performed by: STUDENT IN AN ORGANIZED HEALTH CARE EDUCATION/TRAINING PROGRAM

## 2024-09-26 PROCEDURE — 1126F AMNT PAIN NOTED NONE PRSNT: CPT | Performed by: STUDENT IN AN ORGANIZED HEALTH CARE EDUCATION/TRAINING PROGRAM

## 2024-09-26 RX ORDER — FLUTICASONE PROPIONATE 50 UG/1
1 SPRAY, METERED NASAL DAILY
Qty: 11 ML | Refills: 2 | Status: SHIPPED | OUTPATIENT
Start: 2024-09-26

## 2024-09-26 RX ORDER — AZELASTINE 1 MG/ML
1 SPRAY, METERED NASAL DAILY
Qty: 30 ML | Refills: 0 | Status: SHIPPED | OUTPATIENT
Start: 2024-09-26

## 2024-10-01 NOTE — PROGRESS NOTES
IUD Insertion    Patient's last menstrual period was 09/03/2024 (exact date).    Date of procedure:  10/2/2024    Risks and benefits discussed? yes  All questions answered? yes  Consents given by The patient  Written consent obtained? yes    Local anesthesia used:  no    Procedure documentation:    After verifying the patient had a low probability of being pregnant and met the criteria for insertion, a sterile speculum has placed and the cervix was cleansed with an antiseptic solution.  Vaginal discharge was scant.  The anterior lip of the cervix was grasped with a tenaculum and the uterine cavity was gently sounded. There was no difficulty passing the sound through the cervix.  Cervical dilation did not need to be performed prior to placing the IUD.  The uterus was anteverted and sounded to 8 cms.  The ParaGard was then prepared per the manufacturers instructions.    The Paraguard was advanced through the cervical canal until the upper edge of the flange was flush with the external cervix.  The insertion roselia was held in place while the insertion tube was withdrawn.  I waited 10-15 seconds for the arms of the IUS to fully open and the devise to seat in the cavity.  The roselia was removed first followed by the insertion tube. The string was cut 2 cms in length.  Bleeding from the cervix was scant.    She tolerated the procedure without any difficulty.     The entire procedure was performed under US guidance, with proper position confirmed at the conclusion of the case.     The IUD was clinic supplied.     Post procedure instructions: It was reviewed that the Paraguard will not alter the timing of when she bleeds but it may increase the quantity of flow and cramps. Efficacy rate of > 98% over the 10 years was discussed.  Spontaneous expulsion rate of 1-2% was also discussed.  If she has any issue with fever or excessive bleeding or pain she is to call the office immediately.  Otherwise I would like to see her back in 6  weeks with an ultrasound to confirm correct placement    This note was electronically signed.  Sary Barroso MD   October 2, 2024

## 2024-10-02 ENCOUNTER — OFFICE VISIT (OUTPATIENT)
Dept: OBSTETRICS AND GYNECOLOGY | Facility: CLINIC | Age: 24
End: 2024-10-02
Payer: MEDICAID

## 2024-10-02 DIAGNOSIS — Z13.9 SPECIAL SCREENING: ICD-10-CM

## 2024-10-02 DIAGNOSIS — Z30.430 ENCOUNTER FOR INSERTION OF COPPER IUD: Primary | ICD-10-CM

## 2024-10-02 LAB
B-HCG UR QL: NEGATIVE
EXPIRATION DATE: NORMAL
INTERNAL NEGATIVE CONTROL: NEGATIVE
INTERNAL POSITIVE CONTROL: POSITIVE
Lab: NORMAL
REF LAB TEST METHOD: NORMAL

## 2024-10-02 RX ORDER — COPPER 313.4 MG/1
1 INTRAUTERINE DEVICE INTRAUTERINE
Start: 2024-10-02 | End: 2034-09-30

## 2024-10-02 RX ORDER — COPPER 313.4 MG/1
1 INTRAUTERINE DEVICE INTRAUTERINE ONCE
Status: COMPLETED | OUTPATIENT
Start: 2024-10-02 | End: 2024-10-02

## 2024-10-02 RX ADMIN — COPPER 1 EACH: 313.4 INTRAUTERINE DEVICE INTRAUTERINE at 09:37

## 2024-10-31 NOTE — PROGRESS NOTES
"Subjective   Chief Complaint   Patient presents with    Follow-up     Sono today     Boston Fenton is a 24 y.o. year old  presenting to be seen for follow-up of her recently placed ParaGard.  Since the time of insertion, she has had no problems and has not had any bleeding. She has not yet started her period this month, but is due soon.          Objective   /64 (BP Location: Right arm, Patient Position: Sitting, Cuff Size: Adult)   Ht 167.6 cm (65.98\")   Wt 54.9 kg (121 lb)   LMP 2024 (Exact Date) Comment: Paragard  Breastfeeding No   BMI 19.54 kg/m²     Imaging   Pelvic ultrasound report       Assessment   IUD check   Left adnexal cyst      Plan   TVUS with IUD in proper position, reassurance provided.  Small, left complex adnexal cyst appreciated. Recommend repeat US in 6 weeks.   The importance of keeping all planned follow-up and taking all medications as prescribed was emphasized.  Follow up for TVUS only in 6 weeks and annual exam 2025 or sooner PRN     No orders of the defined types were placed in this encounter.        This note was electronically signed.    Sary Barroso MD   2024  "

## 2024-11-01 ENCOUNTER — OFFICE VISIT (OUTPATIENT)
Dept: OBSTETRICS AND GYNECOLOGY | Facility: CLINIC | Age: 24
End: 2024-11-01
Payer: MEDICAID

## 2024-11-01 VITALS
SYSTOLIC BLOOD PRESSURE: 100 MMHG | DIASTOLIC BLOOD PRESSURE: 64 MMHG | HEIGHT: 66 IN | WEIGHT: 121 LBS | BODY MASS INDEX: 19.44 KG/M2

## 2024-11-01 DIAGNOSIS — Z30.431 IUD CHECK UP: Primary | ICD-10-CM

## 2024-11-01 DIAGNOSIS — N94.9 ADNEXAL CYST: ICD-10-CM

## 2024-11-01 RX ORDER — EPINEPHRINE 0.3 MG/.3ML
INJECTION SUBCUTANEOUS
COMMUNITY
Start: 2024-10-12

## 2024-11-01 RX ORDER — MONTELUKAST SODIUM 10 MG/1
10 TABLET ORAL
COMMUNITY
Start: 2024-10-16

## 2024-11-01 RX ORDER — LEVOCETIRIZINE DIHYDROCHLORIDE 5 MG/1
5 TABLET, FILM COATED ORAL
COMMUNITY
Start: 2024-10-10

## 2024-11-01 RX ORDER — FAMOTIDINE 20 MG/1
20 TABLET, FILM COATED ORAL DAILY
COMMUNITY
Start: 2024-10-10

## 2024-11-01 RX ORDER — AZELASTINE HYDROCHLORIDE AND FLUTICASONE PROPIONATE 137; 50 UG/1; UG/1
SPRAY, METERED NASAL
COMMUNITY
Start: 2024-10-14

## 2025-01-15 ENCOUNTER — TELEPHONE (OUTPATIENT)
Dept: URGENT CARE | Facility: CLINIC | Age: 25
End: 2025-01-15
Payer: MEDICAID
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
## 2025-02-06 ENCOUNTER — PATIENT ROUNDING (BHMG ONLY) (OUTPATIENT)
Dept: URGENT CARE | Facility: CLINIC | Age: 25
End: 2025-02-06
Payer: MEDICAID

## 2025-03-13 ENCOUNTER — TELEPHONE (OUTPATIENT)
Dept: OBSTETRICS AND GYNECOLOGY | Facility: CLINIC | Age: 25
End: 2025-03-13
Payer: MEDICAID

## 2025-03-13 DIAGNOSIS — Z30.431 IUD CHECK UP: Primary | ICD-10-CM

## 2025-03-13 NOTE — TELEPHONE ENCOUNTER
Pt was scheduled with Madison Glasgow for 3/20/2025 as she will be going out of town and that was the first available appt she could come to.

## 2025-03-13 NOTE — TELEPHONE ENCOUNTER
PT CALLING HAVING BLOODY DISCHARGE, PAIN WITH INTERCOURSE   HER FIRST IUD BECAME DISLODGED AND THIS FEELS THE SAME WAY - STATES NEEDS U/S TO SEE IF IN THE RIGHT SPOT - THOUGHTS??  PLEASE ADVISE PATIENT

## 2025-03-18 NOTE — PROGRESS NOTES
Subjective   Chief Complaint   Patient presents with    Follow-up     Follow up after us     Boston Fenton is a 24 y.o. year old .  No LMP recorded. Patient has had an implant.  She presents to be seen for IUD and left ovarian cyst follow-up.  At her last visit in November she had a left ovarian cyst noted.   She had a ParaGard placed 10-24 by Dr. Barroso.  She had follow up u/s 2024 which showed IUD in proper placement, left ovarian cyst noted.   She called for appt today as she noticed some pain with sic and small amount of bleeding. When this happened previously her iud had shifted, this was also a paragard.   She feels well today.   The following portions of the patient's history were reviewed and updated as appropriate:    Social History    Tobacco Use      Smoking status: Never      Smokeless tobacco: Never         Objective   BP 98/60   Wt 57.4 kg (126 lb 9.6 oz)   BMI 21.07 kg/m²     Lab Review   No data reviewed    Imaging   Pelvic ultrasound report   Tvus in office today shows iud slightly lower than fundal position, arms appropriately opened,  Left ovary with small hemorrhagic cyst  Right ovary with small simple cyst     Assessment   Dyspareunia  Paragard iud in place   Left ovarian cyst- resolving  Right simple ovarian cyst      Plan   Reviewed ultrasound report with Boston. Eh await official read of ultrasound but iud appears in proper placement. Encouraged use of condoms until official ultrasound report available.   Discussed right ovarian cyst- will schedule 6 week follow up ultrasound   The importance of keeping all planned follow-up and taking all medications as prescribed was emphasized.  Has annual scheduled in Oct         No orders of the defined types were placed in this encounter.         This note was electronically signed.    Madison Glasgow, ANGELES  2025

## 2025-03-20 ENCOUNTER — OFFICE VISIT (OUTPATIENT)
Dept: OBSTETRICS AND GYNECOLOGY | Facility: CLINIC | Age: 25
End: 2025-03-20
Payer: MEDICAID

## 2025-03-20 VITALS — WEIGHT: 126.6 LBS | BODY MASS INDEX: 21.07 KG/M2 | DIASTOLIC BLOOD PRESSURE: 60 MMHG | SYSTOLIC BLOOD PRESSURE: 98 MMHG

## 2025-03-20 DIAGNOSIS — Z97.5 IUD (INTRAUTERINE DEVICE) IN PLACE: Primary | ICD-10-CM

## 2025-03-20 DIAGNOSIS — N83.201 RIGHT OVARIAN CYST: ICD-10-CM

## 2025-03-20 DIAGNOSIS — N94.10 FEMALE DYSPAREUNIA: ICD-10-CM

## 2025-03-20 DIAGNOSIS — N83.202 LEFT OVARIAN CYST: ICD-10-CM

## 2025-05-30 ENCOUNTER — OFFICE VISIT (OUTPATIENT)
Dept: FAMILY MEDICINE CLINIC | Facility: CLINIC | Age: 25
End: 2025-05-30
Payer: MEDICAID

## 2025-05-30 VITALS
WEIGHT: 118 LBS | HEIGHT: 65 IN | HEART RATE: 88 BPM | OXYGEN SATURATION: 98 % | BODY MASS INDEX: 19.66 KG/M2 | DIASTOLIC BLOOD PRESSURE: 78 MMHG | SYSTOLIC BLOOD PRESSURE: 114 MMHG

## 2025-05-30 DIAGNOSIS — L03.818 CELLULITIS OF OTHER SPECIFIED SITE: ICD-10-CM

## 2025-05-30 DIAGNOSIS — H60.502 ACUTE OTITIS EXTERNA OF LEFT EAR, UNSPECIFIED TYPE: ICD-10-CM

## 2025-05-30 DIAGNOSIS — N39.0 URINARY TRACT INFECTION WITH HEMATURIA, SITE UNSPECIFIED: Primary | ICD-10-CM

## 2025-05-30 DIAGNOSIS — R31.9 URINARY TRACT INFECTION WITH HEMATURIA, SITE UNSPECIFIED: Primary | ICD-10-CM

## 2025-05-30 LAB
BILIRUB BLD-MCNC: NEGATIVE MG/DL
CLARITY, POC: CLEAR
COLOR UR: YELLOW
EXPIRATION DATE: ABNORMAL
GLUCOSE UR STRIP-MCNC: NEGATIVE MG/DL
KETONES UR QL: NEGATIVE
LEUKOCYTE EST, POC: NEGATIVE
Lab: ABNORMAL
NITRITE UR-MCNC: NEGATIVE MG/ML
PH UR: 6 [PH] (ref 5–8)
PROT UR STRIP-MCNC: NEGATIVE MG/DL
RBC # UR STRIP: ABNORMAL /UL
SP GR UR: 1.02 (ref 1–1.03)
UROBILINOGEN UR QL: NORMAL

## 2025-05-30 PROCEDURE — 87086 URINE CULTURE/COLONY COUNT: CPT | Performed by: STUDENT IN AN ORGANIZED HEALTH CARE EDUCATION/TRAINING PROGRAM

## 2025-05-30 RX ORDER — AMMONIUM LACTATE 12 G/100G
LOTION TOPICAL
COMMUNITY
Start: 2025-04-16

## 2025-05-30 RX ORDER — DAPSONE 75 MG/G
GEL TOPICAL
COMMUNITY
Start: 2025-03-14

## 2025-05-30 NOTE — PROGRESS NOTES
Chief Complaint   Patient presents with    Earache     LEFT EAR PAIN     Urinary Tract Infection       HPI:  Boston Fenton is a 25 y.o. female who presents today for above chief complaint.     2 weeks of UTI symptoms- frequency, burning with urination. Currently on menstrual cycle. Has h/o frequent UTIs years ago. No change in diet, denies excessive alcohol or caffeine intake. No nausea or vomiting. No hematuria.     Also has some discomfort at top of left ear over last few days. Thought the top of her ear looked red and swollen. It is tender to touch but no pain inside the inner ear. She did go swimming [place] ago [place]. No new piercings. Hearing is not affected. No fever or chills.    PE:  Vitals:    05/30/25 0812   BP: 114/78   Pulse: 88   SpO2: 98%      Body mass index is 19.64 kg/m².    Gen Appearance: NAD  HEENT: Normocephalic, EOMI, mild erythema of left ear canal with purulent effusion, TM is non bulging, top of external ear is mildly erythematous with mild soft tissue swelling compared to right. Some tenderness noted with palpation of the area. Right external ear is normal, inner ear is without effusion.  Lungs: Normal WOB  MSK: Moves all extremities well, normal gait, no peripheral edema  Neuro: No focal deficits    Current Outpatient Medications   Medication Sig Dispense Refill    ammonium lactate (LAC-HYDRIN) 12 % lotion       azelastine (ASTELIN) 0.1 % nasal spray Administer 1 spray into the nostril(s) as directed by provider Daily. Use in each nostril as directed 30 mL 0    clindamycin-benzoyl peroxide (BENZACLIN) 1-5 % gel       Dapsone 7.5 % gel Apply to acne prone skin once daily.      Dymista 137-50 MCG/ACT suspension       EPINEPHrine (EPIPEN) 0.3 MG/0.3ML solution auto-injector injection  (Patient taking differently: As needed)      famotidine (PEPCID) 20 MG tablet Take 1 tablet by mouth Daily.      fluticasone (FLONASE) 50 MCG/ACT nasal spray Administer 1 spray into the nostril(s)  as directed by provider Daily. 11 mL 2    levocetirizine (XYZAL) 5 MG tablet Take 1 tablet by mouth. (Patient taking differently: Take 1 tablet by mouth. As needed for allergy injection)      montelukast (SINGULAIR) 10 MG tablet Take 1 tablet by mouth. (Patient taking differently: Take 1 tablet by mouth. As needed for allergy injection)      Paragard Intrauterine Copper intrauterine device IUD To be inserted one time by prescriber. Route intrauterine.      Retin-A 0.025 % cream       spironolactone (ALDACTONE) 25 MG tablet       amoxicillin-clavulanate (AUGMENTIN) 875-125 MG per tablet Take 1 tablet by mouth 2 (Two) Times a Day for 7 days. 14 tablet 0     No current facility-administered medications for this visit.        A/P:  Assessment & Plan  Urinary tract infection with hematuria, site unspecified  Urine culture pending  UA shows + blood but no leukocytes or nitrites  Orders:    POCT urinalysis dipstick, automated    Urine Culture - Urine, Urine, Clean Catch; Future    Cellulitis of other specified site  Acute otitis externa of left ear, unspecified type  Trial oral Augmentin, her inner ear canal is not really affected so favor oral abx rather than topical therapy  Strict return precautions reviewed    Orders:    amoxicillin-clavulanate (AUGMENTIN) 875-125 MG per tablet; Take 1 tablet by mouth 2 (Two) Times a Day for 7 days.        Dictated Utilizing Dragon Dictation    Please note that portions of this note were completed with a voice recognition program.    Part of this note may be an electronic transcription/translation of spoken language to printed text using the Dragon Dictation System.

## 2025-06-01 LAB — BACTERIA SPEC AEROBE CULT: NO GROWTH
